# Patient Record
Sex: MALE | Race: NATIVE HAWAIIAN OR OTHER PACIFIC ISLANDER | Employment: FULL TIME | URBAN - METROPOLITAN AREA
[De-identification: names, ages, dates, MRNs, and addresses within clinical notes are randomized per-mention and may not be internally consistent; named-entity substitution may affect disease eponyms.]

---

## 2017-07-28 ENCOUNTER — OFFICE VISIT (OUTPATIENT)
Dept: ENDOCRINOLOGY | Age: 47
End: 2017-07-28

## 2017-07-28 VITALS
RESPIRATION RATE: 20 BRPM | DIASTOLIC BLOOD PRESSURE: 86 MMHG | TEMPERATURE: 97.8 F | BODY MASS INDEX: 37.28 KG/M2 | HEART RATE: 71 BPM | OXYGEN SATURATION: 96 % | WEIGHT: 246 LBS | SYSTOLIC BLOOD PRESSURE: 139 MMHG | HEIGHT: 68 IN

## 2017-07-28 DIAGNOSIS — E78.2 MIXED HYPERLIPIDEMIA: ICD-10-CM

## 2017-07-28 DIAGNOSIS — I10 ESSENTIAL HYPERTENSION: ICD-10-CM

## 2017-07-28 RX ORDER — VALSARTAN 320 MG/1
TABLET ORAL DAILY
COMMUNITY
End: 2017-08-09 | Stop reason: DRUGHIGH

## 2017-07-28 RX ORDER — GLIPIZIDE 5 MG/1
1 TABLET ORAL DAILY
Refills: 0 | COMMUNITY
Start: 2017-06-14 | End: 2017-07-28 | Stop reason: DRUGHIGH

## 2017-07-28 RX ORDER — IBUPROFEN 200 MG
TABLET ORAL
COMMUNITY
End: 2018-01-16 | Stop reason: ALTCHOICE

## 2017-07-28 RX ORDER — METFORMIN HYDROCHLORIDE 1000 MG/1
1000 TABLET ORAL 2 TIMES DAILY WITH MEALS
COMMUNITY
End: 2017-07-28 | Stop reason: ALTCHOICE

## 2017-07-28 RX ORDER — GLIPIZIDE 10 MG/1
TABLET ORAL
Qty: 60 TAB | Refills: 6 | Status: SHIPPED | OUTPATIENT
Start: 2017-07-28 | End: 2017-08-09 | Stop reason: SDUPTHER

## 2017-07-28 NOTE — PATIENT INSTRUCTIONS
Do not skip meals  Do not eat in between meals    Reduce carbs- pasta, rice, potatoes, bread   Do not drink juices or sodas  Donot eat peanut butter     Do not eat sugar free cookies and cakes   Do not eat peaches,  Grapes,  Oranges , raisins, and pineapples     ----------------------------------------------------------------------------------------------------------------------------------------------------------------------    Increase Glipizide 10 mg twice a day , twenty minutes with b-fast and dinner     STOP METFORMIN     Start on  Trulicity 7.19 mg once a week        ---------------------------------------------------------------------------------------------------------------      Elizabeth Foley

## 2017-07-28 NOTE — MR AVS SNAPSHOT
Visit Information Date & Time Provider Department Dept. Phone Encounter #  
 7/28/2017 10:00 AM Marcella Croft MD [facility] [phone] [id number] Follow-up Instructions Return in about 6 weeks (around 9/8/2017). Upcoming Health Maintenance Date Due DTaP/Tdap/Td series (1 - Tdap) 2/2/1990 INFLUENZA AGE 9 TO ADULT 8/1/2017 Allergies as of 7/28/2017  Review Complete On: 7/28/2017 By: Marcella Croft MD  
 Not on File Current Immunizations  Never Reviewed No immunizations on file. Not reviewed this visit You Were Diagnosed With   
  
 Codes Comments Uncontrolled type 2 diabetes mellitus with stage 3 chronic kidney disease, without long-term current use of insulin (HCC)    -  Primary ICD-10-CM: E11.22, E11.65, N18.3 ICD-9-CM: 250.52, 585.3 Vitals BP Pulse Temp Resp Height(growth percentile) Weight(growth percentile) 139/86 71 97.8 °F (36.6 °C) (Oral) 20 5' 8\" (1.727 m) 246 lb (111.6 kg) SpO2 BMI Smoking Status 96% 37.4 kg/m2 Former Smoker BMI and BSA Data Body Mass Index Body Surface Area  
 37.4 kg/m 2 2.31 m 2 Your Updated Medication List  
  
   
This list is accurate as of: 7/28/17 11:06 AM.  Always use your most recent med list. ALLOPURINOL PO Take  by mouth. ATORVASTATIN PO Take  by mouth. DIOVAN 320 mg tablet Generic drug:  valsartan Take  by mouth daily. FISH -160-1,000 mg Cap Generic drug:  omega 3-dha-epa-fish oil Take  by mouth. glipiZIDE 5 mg tablet Commonly known as:  Aldean Temo Take 1 Tab by mouth daily. MOTRIN  mg tablet Generic drug:  ibuprofen Take  by mouth. We Performed the Following HEMOGLOBIN A1C WITH EAG [50238 CPT(R)] LIPID PANEL [19233 CPT(R)] METABOLIC PANEL, COMPREHENSIVE [49377 CPT(R)] MICROALBUMIN, UR, RAND W/ MICROALBUMIN/CREA RATIO [id number] CPT(R)] Follow-up Instructions Return in about 6 weeks (around 9/8/2017). Patient Instructions Do not skip meals Do not eat in between meals Reduce carbs- pasta, rice, potatoes, bread Do not drink juices or sodas Donot eat peanut butter Do not eat sugar free cookies and cakes Do not eat peaches,  Grapes,  Oranges , raisins, and pineapples  
 
---------------------------------------------------------------------------------------------------------------------------------------------------------------------- Increase Glipizide 10 mg twice a day , twenty minutes with b-fast and dinner STOP METFORMIN Start on  Trulicity 4.12 mg once a week   
 
 
--------------------------------------------------------------------------------------------------------------- Elizabeth Foley Introducing [facility]! Alanis Kennedy introduces [employer] patient portal. Now you can access parts of your medical record, email your doctor's office, and request medication refills online. 1. In your internet browser, go to [email or web address] 2. Click on the First Time User? Click Here link in the Sign In box. You will see the New Member Sign Up page. 3. Enter your [employer] Access Code exactly as it appears below. You will not need to use this code after youve completed the sign-up process. If you do not sign up before the expiration date, you must request a new code. · [employer] Access Code: [email or web address] Expires: 10/26/2017 11:06 AM 
 
4. Enter the last four digits of your Social Security Number (xxxx) and Date of Birth (mm/dd/yyyy) as indicated and click Submit. You will be taken to the next sign-up page. 5. Create a [employer] ID. This will be your [employer] login ID and cannot be changed, so think of one that is secure and easy to remember. 6. Create a [employer] password. You can change your password at any time. 7. Enter your Password Reset Question and Answer. This can be used at a later time if you forget your password. 8. Enter your e-mail address. You will receive e-mail notification when new information is available in [address] 9. Click Sign Up. You can now view and download portions of your medical record. 10. Click the Download Summary menu link to download a portable copy of your medical information. If you have questions, please visit the Frequently Asked Questions section of the [employer] website. Remember, [employer] is NOT to be used for urgent needs. For medical emergencies, dial 911. Now available from your iPhone and Android! Please provide this summary of care documentation to your next provider. Your primary care clinician is listed as [address]. If you have any questions after today's visit, please call [phone].

## 2017-07-28 NOTE — LETTER
7/30/2017 7:30 PM 
 
Patient:  Felix Gallegos YOB: 1970 Date of Visit: 7/28/2017 Dear Leila Blue DO 
[address] [place] [id number] VIA Facsimile: [phone] 
 : 
 
 
Thank you for referring Mr. Carlos A Osman to me for evaluation/treatment. Below are the relevant portions of my assessment and plan of care. Wt Readings from Last 3 Encounters:  
07/28/17 246 lb (111.6 kg) Temp Readings from Last 3 Encounters:  
07/28/17 97.8 °F (36.6 °C) (Oral) BP Readings from Last 3 Encounters:  
07/28/17 139/86 Pulse Readings from Last 3 Encounters:  
07/28/17 71 HISTORY OF PRESENT ILLNESS Felix Gallegos is a 49 y.o. male. HPI Patient here for initial visit of Type 2 diabetes mellitus . Referred : by self/pcp H/o diabetes for  8  years Accompanied by wife Current A1C is 8.7 %  and symptoms/problems include polyuria, polydipsia and visual disturbances Current diabetic medications include glipizide   5 mg bid , metformin 1000 mg bid , and 500 mg at lunch time . Current monitoring regimen: home blood tests - daily Home blood sugar records: trend: fluctuating a lot Any episodes of hypoglycemia? no 
 
Weight trend: stable Prior visit with dietician: no 
Current diet: \"unhealthy\" diet in general 
Current exercise: no regular exercise Known diabetic complications: retinopathy, nephropathy and peripheral neuropathy Cardiovascular risk factors: dyslipidemia, diabetes mellitus, obesity, sedentary life style, male gender, hypertension Eye exam current (within one year): no 
LAURENCE: no  
 
Past Medical History:  
Diagnosis Date  DM (diabetes mellitus) ([facility] [address]  HTN (hypertension) Past Surgical History:  
Procedure Laterality Date  HX APPENDECTOMY Current Outpatient Prescriptions Medication Sig  
 glipiZIDE (GLUCOTROL) 5 mg tablet Take 1 Tab by mouth daily.  valsartan (DIOVAN) 320 mg tablet Take  by mouth daily.  metFORMIN (GLUCOPHAGE) 1,000 mg tablet Take 1,000 mg by mouth two (2) times daily (with meals).  ATORVASTATIN CALCIUM (ATORVASTATIN PO) Take  by mouth.  ALLOPURINOL PO Take  by mouth.  omega 3-dha-epa-fish oil (FISH OIL) 100-160-1,000 mg cap Take  by mouth.  ibuprofen (MOTRIN IB) 200 mg tablet Take  by mouth. No current facility-administered medications for this visit. Review of Systems Constitutional: Negative. HENT: Negative. Eyes: Negative for pain and redness. Respiratory: Negative. Cardiovascular: Negative for chest pain, palpitations and leg swelling. Gastrointestinal: Negative. Negative for constipation. Genitourinary: Negative. Musculoskeletal: Negative for myalgias. Skin: Negative. Neurological: Negative. Endo/Heme/Allergies: Negative. Psychiatric/Behavioral: Negative for depression and memory loss. The patient does not have insomnia. Physical Exam  
Constitutional: He is oriented to person, place, and time. He appears well-developed and well-nourished. HENT:  
Head: Normocephalic. Eyes: Conjunctivae and EOM are normal. Pupils are equal, round, and reactive to light. Neck: Normal range of motion. Neck supple. No JVD present. No tracheal deviation present. No thyromegaly present. Cardiovascular: Normal rate, regular rhythm and normal heart sounds. Pulmonary/Chest: Effort normal and breath sounds normal.  
Abdominal: Soft. Bowel sounds are normal.  
Musculoskeletal: Normal range of motion. Lymphadenopathy:  
  He has no cervical adenopathy. Neurological: He is alert and oriented to person, place, and time. He has normal reflexes. Skin: Skin is warm. Psychiatric: He has a normal mood and affect. ASSESSMENT and PLAN 1. Type 2 DM, poorly controlled : a1c is over 10 % Discussed DM 2 patho-physiology extensively STOP metformin for renal insuff Increased Glipizide 10 mg bid Start on trulicity Patient is advised about checking blood sugars 2 times a day and maintaining log book. The danger of having low blood sugars has been explained with inappropriate use of insulin  Patient voiced understanding and using the printed instructions at home. Hypoglycemia management has been explained to the patient. Carbohydrate counting discussed with the patient , referred to free class DTC 
 
lab results and schedule of future lab studies reviewed with patient 
specific diabetic recommendations: diabetic diet discussed in detail, written exchange diet given, low cholesterol diet, weight control and daily exercise discussed, home glucose monitoring emphasized, glucose meter dispensed to patient, all medications, side effects and compliance discussed carefully, use and side effects of insulin is taught and foot care discussed and Podiatry visits discussed 2. Hypoglycemia :  Educated on treating the hypoglycemia. Discussed Glipizide use and prescribed 3. HTN : continue current care. Patient is educated about importance of compliance with anti-hypertensives especially ARB/ACEI 4. Dyslipidemia : continue current meds. Patient is educated about benefits and adverse effects of statins and explained how benefits outweigh risk. 5. use of aspirin to prevent MI and TIA's discussed 6. Diabetic complications :  
 
A. Retinopathy-YES   educated on this complication,  regular f/u with ophthalmologist encouraged B. Nephropathy - YES Creat is 2.0 . Proteinuria present   
educated on this disease and indicated stages and its prognosis. Regular care with nephrologist encouraged C. Peripheral Neuropathy - YES  , mild 
educated on this disease and indicated improvement with good and stable glycemic control D. PAD : NO     order LAURENCE /PVR 
 
E : CAD : NO 
 
F. Erectile dysfunction  :YES 
 
> 50 % of time is spent on counseling If you have questions, please do not hesitate to call me.   I look forward to following Mr. Jimy Mendieta along with you. Sincerely, Lee Cameron MD

## 2017-07-28 NOTE — PROGRESS NOTES
HISTORY OF PRESENT ILLNESS  Lisa Asif is a 49 y.o. male. HPI  Patient here for initial visit of Type 2 diabetes mellitus . Referred : by self/pcp    H/o diabetes for  8  years   Accompanied by wife     Current A1C is 8.7 %  and symptoms/problems include polyuria, polydipsia and visual disturbances     Current diabetic medications include glipizide   5 mg bid , metformin 1000 mg bid , and 500 mg at lunch time . Current monitoring regimen: home blood tests - daily  Home blood sugar records: trend: fluctuating a lot  Any episodes of hypoglycemia? no    Weight trend: stable  Prior visit with dietician: no  Current diet: \"unhealthy\" diet in general  Current exercise: no regular exercise    Known diabetic complications: retinopathy, nephropathy and peripheral neuropathy  Cardiovascular risk factors: dyslipidemia, diabetes mellitus, obesity, sedentary life style, male gender, hypertension    Eye exam current (within one year): no  LAURENCE: no     Past Medical History:   Diagnosis Date    DM (diabetes mellitus) ([facility] [address]     HTN (hypertension)      Past Surgical History:   Procedure Laterality Date    HX APPENDECTOMY       Current Outpatient Prescriptions   Medication Sig    glipiZIDE (GLUCOTROL) 5 mg tablet Take 1 Tab by mouth daily.  valsartan (DIOVAN) 320 mg tablet Take  by mouth daily.  metFORMIN (GLUCOPHAGE) 1,000 mg tablet Take 1,000 mg by mouth two (2) times daily (with meals).  ATORVASTATIN CALCIUM (ATORVASTATIN PO) Take  by mouth.  ALLOPURINOL PO Take  by mouth.  omega 3-dha-epa-fish oil (FISH OIL) 100-160-1,000 mg cap Take  by mouth.  ibuprofen (MOTRIN IB) 200 mg tablet Take  by mouth. No current facility-administered medications for this visit. Review of Systems   Constitutional: Negative. HENT: Negative. Eyes: Negative for pain and redness. Respiratory: Negative. Cardiovascular: Negative for chest pain, palpitations and leg swelling. Gastrointestinal: Negative. Negative for constipation. Genitourinary: Negative. Musculoskeletal: Negative for myalgias. Skin: Negative. Neurological: Negative. Endo/Heme/Allergies: Negative. Psychiatric/Behavioral: Negative for depression and memory loss. The patient does not have insomnia. Physical Exam   Constitutional: He is oriented to person, place, and time. He appears well-developed and well-nourished. HENT:   Head: Normocephalic. Eyes: Conjunctivae and EOM are normal. Pupils are equal, round, and reactive to light. Neck: Normal range of motion. Neck supple. No JVD present. No tracheal deviation present. No thyromegaly present. Cardiovascular: Normal rate, regular rhythm and normal heart sounds. Pulmonary/Chest: Effort normal and breath sounds normal.   Abdominal: Soft. Bowel sounds are normal.   Musculoskeletal: Normal range of motion. Lymphadenopathy:     He has no cervical adenopathy. Neurological: He is alert and oriented to person, place, and time. He has normal reflexes. Skin: Skin is warm. Psychiatric: He has a normal mood and affect. ASSESSMENT and PLAN    1. Type 2 DM, poorly controlled : a1c is over 10 %    Discussed DM 2 patho-physiology extensively     STOP metformin for renal insuff    Increased Glipizide 10 mg bid     Start on trulicity     Patient is advised about checking blood sugars 2 times a day and maintaining log book. The danger of having low blood sugars has been explained with inappropriate use of insulin  Patient voiced understanding and using the printed instructions at home. Hypoglycemia management has been explained to the patient.      Carbohydrate counting discussed with the patient , referred to free class DTC    lab results and schedule of future lab studies reviewed with patient  specific diabetic recommendations: diabetic diet discussed in detail, written exchange diet given, low cholesterol diet, weight control and daily exercise discussed, home glucose monitoring emphasized, glucose meter dispensed to patient, all medications, side effects and compliance discussed carefully, use and side effects of insulin is taught and foot care discussed and Podiatry visits discussed    2. Hypoglycemia :  Educated on treating the hypoglycemia. Discussed Glipizide use and prescribed    3. HTN : continue current care. Patient is educated about importance of compliance with anti-hypertensives especially ARB/ACEI    4. Dyslipidemia : continue current meds. Patient is educated about benefits and adverse effects of statins and explained how benefits outweigh risk. 5. use of aspirin to prevent MI and TIA's discussed      6. Diabetic complications :     A. Retinopathy-YES   educated on this complication,  regular f/u with ophthalmologist encouraged    B. Nephropathy - YES    Creat is 2.0 . Proteinuria present    educated on this disease and indicated stages and its prognosis. Regular care with nephrologist encouraged    C. Peripheral Neuropathy - YES  , mild  educated on this disease and indicated improvement with good and stable glycemic control    D. PAD : NO     order LAURENCE /PVR    E : CAD : NO    F.  Erectile dysfunction  :YES    > 50 % of time is spent on counseling

## 2017-07-28 NOTE — PROGRESS NOTES
Wt Readings from Last 3 Encounters:   07/28/17 246 lb (111.6 kg)     Temp Readings from Last 3 Encounters:   07/28/17 97.8 °F (36.6 °C) (Oral)     BP Readings from Last 3 Encounters:   07/28/17 139/86     Pulse Readings from Last 3 Encounters:   07/28/17 71

## 2017-07-28 NOTE — LETTER
NOTIFICATION RETURN TO WORK / SCHOOL 
 
7/28/2017 11:28 AM 
 
Mr. Karina Peña [address] [id number] To Whom It May Concern: 
 
Karina Peña is currently under the care of [id number] [place] [address] Road. He will return to work/school on:7/29/2017. If there are questions or concerns please have the patient contact our office. Sincerely, Mel Turcios MD

## 2017-07-29 LAB
ALBUMIN SERPL-MCNC: 4.3 G/DL (ref 3.5–5.5)
ALBUMIN/CREAT UR: 2384.6 MG/G CREAT (ref 0–30)
ALBUMIN/GLOB SERPL: 1.5 {RATIO} (ref 1.2–2.2)
ALP SERPL-CCNC: 86 IU/L (ref 39–117)
ALT SERPL-CCNC: 50 IU/L (ref 0–44)
AST SERPL-CCNC: 31 IU/L (ref 0–40)
BILIRUB SERPL-MCNC: 0.3 MG/DL (ref 0–1.2)
BUN SERPL-MCNC: 30 MG/DL (ref 6–24)
BUN/CREAT SERPL: 18 (ref 9–20)
CALCIUM SERPL-MCNC: 9.9 MG/DL (ref 8.7–10.2)
CHLORIDE SERPL-SCNC: 95 MMOL/L (ref 96–106)
CHOLEST SERPL-MCNC: 132 MG/DL (ref 100–199)
CO2 SERPL-SCNC: 27 MMOL/L (ref 18–29)
CREAT SERPL-MCNC: 1.65 MG/DL (ref 0.76–1.27)
CREAT UR-MCNC: 82.7 MG/DL
EST. AVERAGE GLUCOSE BLD GHB EST-MCNC: 174 MG/DL
GLOBULIN SER CALC-MCNC: 2.8 G/DL (ref 1.5–4.5)
GLUCOSE SERPL-MCNC: 89 MG/DL (ref 65–99)
HBA1C MFR BLD: 7.7 % (ref 4.8–5.6)
HDLC SERPL-MCNC: 31 MG/DL
INTERPRETATION, 910389: NORMAL
INTERPRETATION: NORMAL
LDLC SERPL CALC-MCNC: 58 MG/DL (ref 0–99)
Lab: NORMAL
MICROALBUMIN UR-MCNC: 1972.1 UG/ML
PDF IMAGE, 910387: NORMAL
POTASSIUM SERPL-SCNC: 4.1 MMOL/L (ref 3.5–5.2)
PROT SERPL-MCNC: 7.1 G/DL (ref 6–8.5)
SODIUM SERPL-SCNC: 140 MMOL/L (ref 134–144)
TRIGL SERPL-MCNC: 215 MG/DL (ref 0–149)
VLDLC SERPL CALC-MCNC: 43 MG/DL (ref 5–40)

## 2017-07-31 NOTE — PROGRESS NOTES
His kidney values in blood is better, and only low dose metformin er 500 mg bid is okay .  So start it please  But urine is leaking a lot of protein, indicative of diabetic kidney damage

## 2017-08-09 RX ORDER — VALSARTAN AND HYDROCHLOROTHIAZIDE 320; 25 MG/1; MG/1
1 TABLET, FILM COATED ORAL DAILY
Qty: 90 TAB | Refills: 4 | Status: SHIPPED | OUTPATIENT
Start: 2017-08-09 | End: 2018-09-24 | Stop reason: SDUPTHER

## 2017-08-09 RX ORDER — METFORMIN HYDROCHLORIDE 500 MG/1
500 TABLET, EXTENDED RELEASE ORAL 2 TIMES DAILY WITH MEALS
Qty: 180 TAB | Refills: 4 | Status: SHIPPED | OUTPATIENT
Start: 2017-08-09 | End: 2017-10-16 | Stop reason: SDUPTHER

## 2017-08-09 RX ORDER — ATORVASTATIN CALCIUM 10 MG/1
10 TABLET, FILM COATED ORAL
Qty: 90 TAB | Refills: 4 | Status: SHIPPED | OUTPATIENT
Start: 2017-08-09 | End: 2017-10-16 | Stop reason: SDUPTHER

## 2017-08-09 RX ORDER — GLIPIZIDE 10 MG/1
TABLET ORAL
Qty: 180 TAB | Refills: 4 | Status: SHIPPED | OUTPATIENT
Start: 2017-08-09 | End: 2017-10-16 | Stop reason: SDUPTHER

## 2017-09-08 ENCOUNTER — TELEPHONE (OUTPATIENT)
Dept: ENDOCRINOLOGY | Age: 47
End: 2017-09-08

## 2017-09-08 ENCOUNTER — OFFICE VISIT (OUTPATIENT)
Dept: ENDOCRINOLOGY | Age: 47
End: 2017-09-08

## 2017-09-08 VITALS
HEART RATE: 81 BPM | BODY MASS INDEX: 35.31 KG/M2 | TEMPERATURE: 97.5 F | RESPIRATION RATE: 14 BRPM | WEIGHT: 233 LBS | DIASTOLIC BLOOD PRESSURE: 74 MMHG | HEIGHT: 68 IN | SYSTOLIC BLOOD PRESSURE: 117 MMHG

## 2017-09-08 DIAGNOSIS — I10 ESSENTIAL HYPERTENSION: ICD-10-CM

## 2017-09-08 DIAGNOSIS — N18.30 TYPE 2 DIABETES MELLITUS WITH STAGE 3 CHRONIC KIDNEY DISEASE, WITHOUT LONG-TERM CURRENT USE OF INSULIN (HCC): Primary | ICD-10-CM

## 2017-09-08 DIAGNOSIS — E78.2 MIXED HYPERLIPIDEMIA: ICD-10-CM

## 2017-09-08 DIAGNOSIS — E11.22 TYPE 2 DIABETES MELLITUS WITH STAGE 3 CHRONIC KIDNEY DISEASE, WITHOUT LONG-TERM CURRENT USE OF INSULIN (HCC): Primary | ICD-10-CM

## 2017-09-08 RX ORDER — CLONIDINE HYDROCHLORIDE 0.1 MG/1
TABLET ORAL 2 TIMES DAILY
COMMUNITY
End: 2017-09-08 | Stop reason: ALTCHOICE

## 2017-09-08 NOTE — PROGRESS NOTES
HISTORY OF PRESENT ILLNESS  Anjali Forbes is a 49 y.o. male. HPI  Patient here for   First f/u after initial visit of Type 2 diabetes mellitus   From July 2017  . Lost 13 lbs   He has done all the instructions correctly   He is happy with the control     Has  A  few low sugars         Old history :    Referred : by self/pcp    H/o diabetes for  8  years   Accompanied by wife     Current A1C is 8.7 %  and symptoms/problems include polyuria, polydipsia and visual disturbances     Current diabetic medications include glipizide   5 mg bid , metformin 1000 mg bid , and 500 mg at lunch time . Current monitoring regimen: home blood tests - daily  Home blood sugar records: trend: fluctuating a lot  Any episodes of hypoglycemia? no    Weight trend: stable  Prior visit with dietician: no  Current diet: \"unhealthy\" diet in general  Current exercise: no regular exercise    Known diabetic complications: retinopathy, nephropathy and peripheral neuropathy  Cardiovascular risk factors: dyslipidemia, diabetes mellitus, obesity, sedentary life style, male gender, hypertension    Eye exam current (within one year): no  LAURENCE: no     Past Medical History:   Diagnosis Date    DM (diabetes mellitus) ([facility] [address]     HTN (hypertension)      Past Surgical History:   Procedure Laterality Date    HX APPENDECTOMY       Current Outpatient Prescriptions   Medication Sig    cloNIDine HCl (CATAPRES) 0.1 mg tablet Take  by mouth two (2) times a day.  glipiZIDE (GLUCOTROL) 10 mg tablet Take 1 tab twenty minutes before breakfast, and 1 tab twenty minutes before dinner. Stop 5 mg dose    dulaglutide (TRULICITY) 5.99 OK/0.6 mL sub-q pen 0.5 mL by SubCUTAneous route every seven (7) days.  atorvastatin (LIPITOR) 10 mg tablet Take 1 Tab by mouth nightly.  valsartan-hydroCHLOROthiazide (DIOVAN-HCT) 320-25 mg per tablet Take 1 Tab by mouth daily.     metFORMIN ER (GLUCOPHAGE XR) 500 mg tablet Take 1 Tab by mouth two (2) times daily (with meals).  ALLOPURINOL PO Take 100 mg by mouth daily.  omega 3-dha-epa-fish oil (FISH OIL) 100-160-1,000 mg cap Take  by mouth.  ibuprofen (MOTRIN IB) 200 mg tablet Take  by mouth. No current facility-administered medications for this visit. Review of Systems   Constitutional: Negative. HENT: Negative. Eyes: Negative for pain and redness. Respiratory: Negative. Cardiovascular: Negative for chest pain, palpitations and leg swelling. Gastrointestinal: Negative. Negative for constipation. Genitourinary: Negative. Musculoskeletal: Negative for myalgias. Skin: Negative. Neurological: Negative. Endo/Heme/Allergies: Negative. Psychiatric/Behavioral: Negative for depression and memory loss. The patient does not have insomnia. Physical Exam   Constitutional: He is oriented to person, place, and time. He appears well-developed and well-nourished. HENT:   Head: Normocephalic. Eyes: Conjunctivae and EOM are normal. Pupils are equal, round, and reactive to light. Neck: Normal range of motion. Neck supple. No JVD present. No tracheal deviation present. No thyromegaly present. Cardiovascular: Normal rate, regular rhythm and normal heart sounds. Pulmonary/Chest: Effort normal and breath sounds normal.   Abdominal: Soft. Bowel sounds are normal.   Musculoskeletal: Normal range of motion. Lymphadenopathy:     He has no cervical adenopathy. Neurological: He is alert and oriented to person, place, and time. He has normal reflexes. Skin: Skin is warm. Psychiatric: He has a normal mood and affect.          Lab Results  Component Value Date/Time   Hemoglobin A1c 7.7 07/28/2017 11:15 AM   Glucose 89 07/28/2017 11:15 AM   Microalb/Creat ratio (ug/mg creat.) 2384.6 07/28/2017 11:15 AM   LDL, calculated 58 07/28/2017 11:15 AM   Creatinine 1.65 07/28/2017 11:15 AM      Lab Results  Component Value Date/Time   Cholesterol, total 132 07/28/2017 11:15 AM   HDL Cholesterol 31 07/28/2017 11:15 AM   LDL, calculated 58 07/28/2017 11:15 AM   Triglyceride 215 07/28/2017 11:15 AM   Lab Results  Component Value Date/Time   ALT (SGPT) 50 07/28/2017 11:15 AM   AST (SGOT) 31 07/28/2017 11:15 AM   Alk. phosphatase 86 07/28/2017 11:15 AM   Bilirubin, total 0.3 07/28/2017 11:15 AM   Albumin 4.3 07/28/2017 11:15 AM   Protein, total 7.1 07/28/2017 11:15 AM       Lab Results  Component Value Date/Time   GFR est non-AA 48 07/28/2017 11:15 AM   GFR est AA 56 07/28/2017 11:15 AM   Creatinine 1.65 07/28/2017 11:15 AM   BUN 30 07/28/2017 11:15 AM   Sodium 140 07/28/2017 11:15 AM   Potassium 4.1 07/28/2017 11:15 AM   Chloride 95 07/28/2017 11:15 AM   CO2 27 07/28/2017 11:15 AM                           ASSESSMENT and PLAN    1. Type 2 DM, poorly controlled :  Await A1c today    a1c is over 10 %    He has done excellently   Restarted metformin  500 mg twice a day for renal insuff  Decreased Glipizide to 5  mg bid   Stay  on [facility]     Patient is advised about checking blood sugars 2 times a day and maintaining log book. The danger of having low blood sugars has been explained with inappropriate use of insulin  Patient voiced understanding and using the printed instructions at home. Hypoglycemia management has been explained to the patient. 2. Hypoglycemia :  Educated on treating the hypoglycemia. Decreased Glipizide use and prescribed    3. HTN : well controlled, continue diovan-hctz. Patient is educated about importance of compliance with anti-hypertensives especially ARB/ACEI    4. Dyslipidemia : continue lipitor . Patient is educated about benefits and adverse effects of statins and explained how benefits outweigh risk. 5. use of aspirin to prevent MI and TIA's discussed      6. Diabetic complications :     A. Retinopathy-YES   educated on this complication,  regular f/u with ophthalmologist encouraged    B. Nephropathy - YES    Creat is 1.6 .    Significant Proteinuria present    educated on this disease and indicated stages and its prognosis. Regular care with nephrologist encouraged    C. Peripheral Neuropathy - YES  , mild  educated on this disease and indicated improvement with good and stable glycemic control    D.  PAD : NO        E : CAD : NO      > 50 % of time is spent on counseling

## 2017-09-08 NOTE — PATIENT INSTRUCTIONS
Do not skip meals  Do not eat in between meals    Reduce carbs- pasta, rice, potatoes, bread   Do not drink juices or sodas  Donot eat peanut butter     Do not eat sugar free cookies and cakes   Do not eat peaches,  Grapes,  Oranges , raisins, and pineapples     ----------------------------------------------------------------------------------------------------------------------------------------------------------------------    decrease Glipizide 5 mg twice a day , twenty minutes with b-fast and dinner     STOP Clonidine     METFORMIN  Er 500 mg twice a day with food     Stay  on  Trulicity 6.56 mg once a week        ---------------------------------------------------------------------------------------------------------------      Yared Klein

## 2017-09-08 NOTE — PROGRESS NOTES
Wt Readings from Last 3 Encounters:   09/08/17 233 lb (105.7 kg)   07/28/17 246 lb (111.6 kg)     Temp Readings from Last 3 Encounters:   09/08/17 97.5 °F (36.4 °C) (Oral)   07/28/17 97.8 °F (36.6 °C) (Oral)     BP Readings from Last 3 Encounters:   09/08/17 117/74   07/28/17 139/86     Pulse Readings from Last 3 Encounters:   09/08/17 81   07/28/17 71     Lab Results   Component Value Date/Time    Hemoglobin A1c 7.7 07/28/2017 11:15 AM

## 2017-09-08 NOTE — LETTER
NOTIFICATION RETURN TO WORK / SCHOOL 
 
9/8/2017 12:26 PM 
 
Mr. Augustina Correa [address] [address] To Whom It May Concern: 
 
Augustina Correa is currently under the care of [address] [address] [address] He will return to work/school on:09/09/2017 If there are questions or concerns please have the patient contact our office. Sincerely, Miroslava Lopez MD

## 2017-09-08 NOTE — MR AVS SNAPSHOT
Visit Information Date & Time Provider Department Dept. Phone Encounter #  
 9/8/2017 11:15 AM Chun Borges MD [facility] Diabetes & Endocrinology [phone] [id number] Follow-up Instructions Return in about 3 months (around 12/8/2017). Upcoming Health Maintenance Date Due DTaP/Tdap/Td series (1 - Tdap) 2/2/1990 INFLUENZA AGE 9 TO ADULT 8/1/2017 Allergies as of 9/8/2017  Review Complete On: 9/8/2017 By: Chun Borges MD  
 Not on File Current Immunizations  Never Reviewed No immunizations on file. Not reviewed this visit You Were Diagnosed With   
  
 Codes Comments Type 2 diabetes mellitus with stage 3 chronic kidney disease, without long-term current use of insulin (HCC)    -  Primary ICD-10-CM: E11.22, N18.3 ICD-9-CM: 250.40, 585.3 Essential hypertension     ICD-10-CM: I10 
ICD-9-CM: 401.9 Mixed hyperlipidemia     ICD-10-CM: E78.2 ICD-9-CM: 272.2 Vitals BP Pulse Temp Resp Height(growth percentile) Weight(growth percentile) 117/74 (BP 1 Location: Left arm, BP Patient Position: Sitting) 81 97.5 °F (36.4 °C) (Oral) 14 5' 8\" (1.727 m) 233 lb (105.7 kg) BMI Smoking Status 35.43 kg/m2 Former Smoker BMI and BSA Data Body Mass Index Body Surface Area  
 35.43 kg/m 2 2.25 m 2 Preferred Pharmacy Pharmacy Name Phone [address] [place] [address], [facility] Sandra Gutiérrez\[facility] [place] [facility]\"" [phone] Your Updated Medication List  
  
   
This list is accurate as of: 9/8/17 12:18 PM.  Always use your most recent med list. ALLOPURINOL PO Take 100 mg by mouth daily. atorvastatin 10 mg tablet Commonly known as:  LIPITOR Take 1 Tab by mouth nightly. dulaglutide 0.75 mg/0.5 mL sub-q pen Commonly known as:  TRULICITY  
0.5 mL by SubCUTAneous route every seven (7) days. FISH -160-1,000 mg Cap Generic drug:  omega 3-dha-epa-fish oil Take  by mouth. glipiZIDE 10 mg tablet Commonly known as:  [place] Fuel Take 1 tab twenty minutes before breakfast, and 1 tab twenty minutes before dinner. Stop 5 mg dose  
  
 metFORMIN  mg tablet Commonly known as:  GLUCOPHAGE XR Take 1 Tab by mouth two (2) times daily (with meals). MOTRIN  mg tablet Generic drug:  ibuprofen Take  by mouth.  
  
 valsartan-hydroCHLOROthiazide 320-25 mg per tablet Commonly known as:  DIOVAN-HCT Take 1 Tab by mouth daily. We Performed the Following HEMOGLOBIN A1C WITH EAG [31498 CPT(R)] LIPID PANEL [60113 CPT(R)] METABOLIC PANEL, COMPREHENSIVE [03810 CPT(R)] MICROALBUMIN, UR, RAND W/ MICROALBUMIN/CREA RATIO [id number] CPT(R)] Follow-up Instructions Return in about 3 months (around 12/8/2017). Patient Instructions Do not skip meals Do not eat in between meals Reduce carbs- pasta, rice, potatoes, bread Do not drink juices or sodas Donot eat peanut butter Do not eat sugar free cookies and cakes Do not eat peaches,  Grapes,  Oranges , raisins, and pineapples  
 
---------------------------------------------------------------------------------------------------------------------------------------------------------------------- 
 
decrease Glipizide 5 mg twice a day , twenty minutes with b-fast and dinner STOP Clonidine METFORMIN  Er 500 mg twice a day with food Stay  on  Trulicity 6.56 mg once a week   
 
 
--------------------------------------------------------------------------------------------------------------- Laureen Pimentel Introducing [facility] & HEALTH SERVICES! [facility] [facility] introduces [employer] patient portal. Now you can access parts of your medical record, email your doctor's office, and request medication refills online. 1. In your internet browser, go to [email or web address] 2. Click on the First Time User? Click Here link in the Sign In box.  You will see the New Member Sign Up page. 3. Enter your Long Tail Access Code exactly as it appears below. You will not need to use this code after youve completed the sign-up process. If you do not sign up before the expiration date, you must request a new code. · Long Tail Access Code: [email or web address] Expires: 10/26/2017 11:06 AM 
 
4. Enter the last four digits of your Social Security Number (xxxx) and Date of Birth (mm/dd/yyyy) as indicated and click Submit. You will be taken to the next sign-up page. 5. Create a Mobiveryt ID. This will be your Long Tail login ID and cannot be changed, so think of one that is secure and easy to remember. 6. Create a Long Tail password. You can change your password at any time. 7. Enter your Password Reset Question and Answer. This can be used at a later time if you forget your password. 8. Enter your e-mail address. You will receive e-mail notification when new information is available in [address] 9. Click Sign Up. You can now view and download portions of your medical record. 10. Click the Download Summary menu link to download a portable copy of your medical information. If you have questions, please visit the Frequently Asked Questions section of the Long Tail website. Remember, Long Tail is NOT to be used for urgent needs. For medical emergencies, dial 911. Now available from your iPhone and Android! Please provide this summary of care documentation to your next provider. Your primary care clinician is listed as Luz Loco. If you have any questions after today's visit, please call [phone].

## 2017-09-09 LAB
ALBUMIN SERPL-MCNC: 4.6 G/DL (ref 3.5–5.5)
ALBUMIN/CREAT UR: 1107.1 MG/G CREAT (ref 0–30)
ALBUMIN/GLOB SERPL: 1.6 {RATIO} (ref 1.2–2.2)
ALP SERPL-CCNC: 72 IU/L (ref 39–117)
ALT SERPL-CCNC: 40 IU/L (ref 0–44)
AST SERPL-CCNC: 29 IU/L (ref 0–40)
BILIRUB SERPL-MCNC: <0.2 MG/DL (ref 0–1.2)
BUN SERPL-MCNC: 65 MG/DL (ref 6–24)
BUN/CREAT SERPL: 27 (ref 9–20)
CALCIUM SERPL-MCNC: 10.1 MG/DL (ref 8.7–10.2)
CHLORIDE SERPL-SCNC: 99 MMOL/L (ref 96–106)
CHOLEST SERPL-MCNC: 111 MG/DL (ref 100–199)
CO2 SERPL-SCNC: 24 MMOL/L (ref 18–29)
CREAT SERPL-MCNC: 2.44 MG/DL (ref 0.76–1.27)
CREAT UR-MCNC: 90 MG/DL
EST. AVERAGE GLUCOSE BLD GHB EST-MCNC: 137 MG/DL
GLOBULIN SER CALC-MCNC: 2.8 G/DL (ref 1.5–4.5)
GLUCOSE SERPL-MCNC: 63 MG/DL (ref 65–99)
HBA1C MFR BLD: 6.4 % (ref 4.8–5.6)
HDLC SERPL-MCNC: 30 MG/DL
INTERPRETATION, 910389: NORMAL
INTERPRETATION: NORMAL
LDLC SERPL CALC-MCNC: 48 MG/DL (ref 0–99)
Lab: NORMAL
MICROALBUMIN UR-MCNC: 996.4 UG/ML
PDF IMAGE, 910387: NORMAL
POTASSIUM SERPL-SCNC: 4.3 MMOL/L (ref 3.5–5.2)
PROT SERPL-MCNC: 7.4 G/DL (ref 6–8.5)
SODIUM SERPL-SCNC: 140 MMOL/L (ref 134–144)
TRIGL SERPL-MCNC: 166 MG/DL (ref 0–149)
VLDLC SERPL CALC-MCNC: 33 MG/DL (ref 5–40)

## 2017-10-16 RX ORDER — ATORVASTATIN CALCIUM 10 MG/1
10 TABLET, FILM COATED ORAL
Qty: 90 TAB | Refills: 4 | Status: SHIPPED | OUTPATIENT
Start: 2017-10-16 | End: 2018-12-21 | Stop reason: SDUPTHER

## 2017-10-16 RX ORDER — GLIPIZIDE 10 MG/1
TABLET ORAL
Qty: 180 TAB | Refills: 4 | Status: SHIPPED | OUTPATIENT
Start: 2017-10-16 | End: 2018-01-16 | Stop reason: ALTCHOICE

## 2017-10-16 RX ORDER — METFORMIN HYDROCHLORIDE 500 MG/1
500 TABLET, EXTENDED RELEASE ORAL 2 TIMES DAILY WITH MEALS
Qty: 180 TAB | Refills: 4 | Status: SHIPPED | OUTPATIENT
Start: 2017-10-16 | End: 2018-01-16 | Stop reason: ALTCHOICE

## 2017-10-16 NOTE — TELEPHONE ENCOUNTER
Refill on Trulicity, Metformin, Lipitor, Glipizide, and Allopurnol 90 day supply to [facility].

## 2018-01-09 LAB
ALBUMIN SERPL-MCNC: 4.4 G/DL (ref 3.5–5.5)
ALBUMIN/CREAT UR: 766 MG/G CREAT (ref 0–30)
ALBUMIN/GLOB SERPL: 1.6 {RATIO} (ref 1.2–2.2)
ALP SERPL-CCNC: 78 IU/L (ref 39–117)
ALT SERPL-CCNC: 32 IU/L (ref 0–44)
AST SERPL-CCNC: 20 IU/L (ref 0–40)
BILIRUB SERPL-MCNC: 0.2 MG/DL (ref 0–1.2)
BUN SERPL-MCNC: 49 MG/DL (ref 6–24)
BUN/CREAT SERPL: 22 (ref 9–20)
CALCIUM SERPL-MCNC: 9.9 MG/DL (ref 8.7–10.2)
CHLORIDE SERPL-SCNC: 100 MMOL/L (ref 96–106)
CHOLEST SERPL-MCNC: 112 MG/DL (ref 100–199)
CO2 SERPL-SCNC: 26 MMOL/L (ref 18–29)
CREAT SERPL-MCNC: 2.23 MG/DL (ref 0.76–1.27)
CREAT UR-MCNC: 96.8 MG/DL
EST. AVERAGE GLUCOSE BLD GHB EST-MCNC: 108 MG/DL
GLOBULIN SER CALC-MCNC: 2.8 G/DL (ref 1.5–4.5)
GLUCOSE SERPL-MCNC: 87 MG/DL (ref 65–99)
HBA1C MFR BLD: 5.4 % (ref 4.8–5.6)
HDLC SERPL-MCNC: 35 MG/DL
INTERPRETATION, 910389: NORMAL
INTERPRETATION: NORMAL
LDLC SERPL CALC-MCNC: 57 MG/DL (ref 0–99)
Lab: NORMAL
MICROALBUMIN UR-MCNC: 741.5 UG/ML
PDF IMAGE, 910387: NORMAL
POTASSIUM SERPL-SCNC: 3.9 MMOL/L (ref 3.5–5.2)
PROT SERPL-MCNC: 7.2 G/DL (ref 6–8.5)
SODIUM SERPL-SCNC: 142 MMOL/L (ref 134–144)
TRIGL SERPL-MCNC: 102 MG/DL (ref 0–149)
VLDLC SERPL CALC-MCNC: 20 MG/DL (ref 5–40)

## 2018-01-16 ENCOUNTER — OFFICE VISIT (OUTPATIENT)
Dept: ENDOCRINOLOGY | Age: 48
End: 2018-01-16

## 2018-01-16 VITALS
DIASTOLIC BLOOD PRESSURE: 70 MMHG | WEIGHT: 212.9 LBS | BODY MASS INDEX: 32.27 KG/M2 | RESPIRATION RATE: 18 BRPM | SYSTOLIC BLOOD PRESSURE: 107 MMHG | HEART RATE: 55 BPM | HEIGHT: 68 IN | TEMPERATURE: 97.1 F | OXYGEN SATURATION: 97 %

## 2018-01-16 DIAGNOSIS — I10 ESSENTIAL HYPERTENSION: ICD-10-CM

## 2018-01-16 DIAGNOSIS — N18.30 STAGE 3 CHRONIC KIDNEY DISEASE (HCC): ICD-10-CM

## 2018-01-16 DIAGNOSIS — R80.1 PERSISTENT PROTEINURIA: ICD-10-CM

## 2018-01-16 DIAGNOSIS — E78.2 MIXED HYPERLIPIDEMIA: ICD-10-CM

## 2018-01-16 RX ORDER — METFORMIN HYDROCHLORIDE 500 MG/1
500 TABLET, EXTENDED RELEASE ORAL 2 TIMES DAILY WITH MEALS
Qty: 180 TAB | Refills: 4 | Status: SHIPPED | OUTPATIENT
Start: 2018-01-16 | End: 2019-04-11 | Stop reason: SDUPTHER

## 2018-01-16 NOTE — PROGRESS NOTES
HISTORY OF PRESENT ILLNESS  Corey Larkin is a 49 y.o. male. HPI  Patient here for  f/u after last visit of Type 2 diabetes mellitus   From sept 2017  . Lost 11 lbs   He got hospitalized for ARF from Motrin use / rash    He was told to take lesser doses of metformin and was told to stop glipizide   He was seen by Dr. Ken Armas, nephrologist     He is moving to [facility]          Old history :    Referred : by self/pcp    H/o diabetes for  8  years   Accompanied by wife     Current A1C is 8.7 %  and symptoms/problems include polyuria, polydipsia and visual disturbances     Current diabetic medications include glipizide   5 mg bid , metformin 1000 mg bid , and 500 mg at lunch time . Current monitoring regimen: home blood tests - daily  Home blood sugar records: trend: fluctuating a lot  Any episodes of hypoglycemia? no    Weight trend: stable  Prior visit with dietician: no  Current diet: \"unhealthy\" diet in general  Current exercise: no regular exercise    Known diabetic complications: retinopathy, nephropathy and peripheral neuropathy  Cardiovascular risk factors: dyslipidemia, diabetes mellitus, obesity, sedentary life style, male gender, hypertension    Eye exam current (within one year): no  LAURENCE: no     Past Medical History:   Diagnosis Date    DM (diabetes mellitus) ([facility] [address]     HTN (hypertension)      Past Surgical History:   Procedure Laterality Date    HX APPENDECTOMY       Current Outpatient Prescriptions   Medication Sig    metFORMIN ER (GLUCOPHAGE XR) 500 mg tablet Take 1 Tab by mouth two (2) times daily (with meals).  dulaglutide (TRULICITY) 6.63 TW/4.3 mL sub-q pen 0.5 mL by SubCUTAneous route every seven (7) days.  atorvastatin (LIPITOR) 10 mg tablet Take 1 Tab by mouth nightly.  valsartan-hydroCHLOROthiazide (DIOVAN-HCT) 320-25 mg per tablet Take 1 Tab by mouth daily.  ALLOPURINOL PO Take 100 mg by mouth daily.     omega 3-dha-epa-fish oil (FISH OIL) 100-160-1,000 mg cap Take by mouth.  glipiZIDE (GLUCOTROL) 10 mg tablet Take 1 tab twenty minutes before breakfast, and 1 tab twenty minutes before dinner. Stop 5 mg dose    ibuprofen (MOTRIN IB) 200 mg tablet Take  by mouth. No current facility-administered medications for this visit. Review of Systems   Constitutional: Negative. HENT: Negative. Eyes: Negative for pain and redness. Respiratory: Negative. Cardiovascular: Negative for chest pain, palpitations and leg swelling. Gastrointestinal: Negative. Negative for constipation. Genitourinary: Negative. Musculoskeletal: Negative for myalgias. Skin: Negative. Neurological: Negative. Endo/Heme/Allergies: Negative. Psychiatric/Behavioral: Negative for depression and memory loss. The patient does not have insomnia. Physical Exam   Constitutional: He is oriented to person, place, and time. He appears well-developed and well-nourished. HENT:   Head: Normocephalic. Eyes: Conjunctivae and EOM are normal. Pupils are equal, round, and reactive to light. Neck: Normal range of motion. Neck supple. No JVD present. No tracheal deviation present. No thyromegaly present. Cardiovascular: Normal rate, regular rhythm and normal heart sounds. Pulmonary/Chest: Effort normal and breath sounds normal.   Abdominal: Soft. Bowel sounds are normal.   Musculoskeletal: Normal range of motion. Lymphadenopathy:     He has no cervical adenopathy. Neurological: He is alert and oriented to person, place, and time. He has normal reflexes. Skin: Skin is warm. Psychiatric: He has a normal mood and affect.      Diabetic foot exam: jan 2018    Left: Reflexes nd     Vibratory sensation normal    Proprioception nd   Sharp/dull discrimination nd    Filament test normal sensation with micro filament   Pulse DP: 2+ (normal)   Pulse PT: nd   Deformities: None  Right: Reflexes nd   Vibratory sensation normal   Proprioception nd   Sharp/dull discrimination nd   Filament test normal sensation with micro filament   Pulse DP: 2+ (normal)   Pulse PT: nd   Deformities: None          Lab Results   Component Value Date/Time    Hemoglobin A1c 5.4 01/08/2018 09:18 AM    Hemoglobin A1c 6.4 09/08/2017 12:23 PM    Hemoglobin A1c 7.7 07/28/2017 11:15 AM    Glucose 87 01/08/2018 09:18 AM    Microalb/Creat ratio (ug/mg creat.) 766.0 01/08/2018 09:18 AM    LDL, calculated 57 01/08/2018 09:18 AM    Creatinine 2.23 01/08/2018 09:18 AM      Lab Results   Component Value Date/Time    Cholesterol, total 112 01/08/2018 09:18 AM    HDL Cholesterol 35 01/08/2018 09:18 AM    LDL, calculated 57 01/08/2018 09:18 AM    Triglyceride 102 01/08/2018 09:18 AM     Lab Results   Component Value Date/Time    ALT (SGPT) 32 01/08/2018 09:18 AM    AST (SGOT) 20 01/08/2018 09:18 AM    Alk. phosphatase 78 01/08/2018 09:18 AM    Bilirubin, total 0.2 01/08/2018 09:18 AM    Albumin 4.4 01/08/2018 09:18 AM    Protein, total 7.2 01/08/2018 09:18 AM       Lab Results   Component Value Date/Time    GFR est non-AA 34 01/08/2018 09:18 AM    GFR est AA 39 01/08/2018 09:18 AM    Creatinine 2.23 01/08/2018 09:18 AM    BUN 49 01/08/2018 09:18 AM    Sodium 142 01/08/2018 09:18 AM    Potassium 3.9 01/08/2018 09:18 AM    Chloride 100 01/08/2018 09:18 AM    CO2 26 01/08/2018 09:18 AM         ASSESSMENT and PLAN    1. Type 2 DM, uncontrolled :  a1c   Is   5.4 % compared to  a1c 6.4 %     From sept 2017    Compared to   7.7 %   From July 2017   Compared to  over 10 %    He has done well   Held off  metformin  But started him back on it at 500 mg twice a day for ARF at hospital   Stopped Glipizide to 5  mg bid for ARF at hospital     Stay  on trulicity ONLY    Patient is advised about checking blood sugars 2 times a day and maintaining log book. The danger of having low blood sugars has been explained with inappropriate use of insulin  Patient voiced understanding and using the printed instructions at home.  Hypoglycemia management has been explained to the patient. 2. Hypoglycemia :  Educated on treating the hypoglycemia. Decreased Glipizide use and prescribed    3. HTN : well controlled, continue diovan-hctz. Patient is educated about importance of compliance with anti-hypertensives especially ARB/ACEI    4. Dyslipidemia : continue lipitor . Patient is educated about benefits and adverse effects of statins and explained how benefits outweigh risk. 5. use of aspirin to prevent MI and TIA's discussed      6. Diabetic complications :     A. Retinopathy-YES   educated on this complication,  regular f/u with ophthalmologist encouraged    B. Nephropathy - YES  At 2.4  Compared to Creat is 1.6  From July 2017 . Significant Proteinuria present    educated on this disease and indicated stages and its prognosis. Regular care with nephrologist encouraged    C.  Peripheral Neuropathy - YES  , mild  educated on this disease and indicated improvement with good and stable glycemic control          > 50 % of time is spent on counseling   Patient voiced understanding her plan of care

## 2018-01-16 NOTE — PROGRESS NOTES
1. Have you been to the ER, urgent care clinic since your last visit? Hospitalized since your last visit? No    2. Have you seen or consulted any other health care providers outside of the [address] [place] [address] since your last visit? Include any pap smears or colon scree.  No     Chief Complaint   Patient presents with    Diabetes     follow up        Wt Readings from Last 3 Encounters:   01/16/18 212 lb 14.4 oz (96.6 kg)   09/08/17 233 lb (105.7 kg)   07/28/17 246 lb (111.6 kg)     Temp Readings from Last 3 Encounters:   01/16/18 97.1 °F (36.2 °C) (Oral)   09/08/17 97.5 °F (36.4 °C) (Oral)   07/28/17 97.8 °F (36.6 °C) (Oral)     BP Readings from Last 3 Encounters:   01/16/18 107/70   09/08/17 117/74   07/28/17 139/86     Pulse Readings from Last 3 Encounters:   01/16/18 (!) 55   09/08/17 81   07/28/17 71     Lab Results   Component Value Date/Time    Hemoglobin A1c 5.4 01/08/2018 09:18 AM

## 2018-01-16 NOTE — PATIENT INSTRUCTIONS
Do not skip meals  Do not eat in between meals    Reduce carbs- pasta, rice, potatoes, bread   Do not drink juices or sodas  Donot eat peanut butter     Do not eat sugar free cookies and cakes   Do not eat peaches,  Grapes,  Oranges , raisins, and pineapples     ----------------------------------------------------------------------------------------------------------------------------------------------------------------------    Stop   Glipizide     METFORMIN  Er 500 mg twice a day with food     Stay  on  Trulicity 2.15 mg once a week

## 2018-01-16 NOTE — MR AVS SNAPSHOT
[address] [facility] [address] [facility] [address] 
[phone] Patient: Madhav Domingo MRN: [id number] Visit Information Date & Time Provider Department Dept. Phone Encounter #  
 2018  9:15 AM Isha Alatorre MD [facility]ology [phone] [id number] Follow-up Instructions Return in about 4 months (around 2018). Follow-up and Disposition History Your Appointments 2018  8:00 AM  
LAB with CDE NURSE [facility] Appt Note: labs 100 [address] [place] [address] [address] [place] [id number]  
[phone]  
  
   
 [address] [place] [address]  
  
    
 2018  9:00 AM  
ROUTINE CARE with Isha Alatorre MD  
[facility] Appt Note: 4mo fu  
 [address] [facility] [address]  
[phone]  
  
   
 [address] [place] [address] Upcoming Health Maintenance Date Due Pneumococcal 19-64 Medium Risk (1 of 1 - PPSV23) 1988 DTaP/Tdap/Td series (1 - Tdap) 1990 Influenza Age 5 to Adult 2017 HEMOGLOBIN A1C Q6M 2018 MICROALBUMIN Q1 2019 LIPID PANEL Q1 2019 FOOT EXAM Q1 2019 EYE EXAM RETINAL OR DILATED Q1 2019 Allergies as of 2018  Review Complete On: 2018 By: Isha Alatorre MD  
 Not on File Current Immunizations  Never Reviewed No immunizations on file. Not reviewed this visit You Were Diagnosed With   
  
 Codes Comments Uncontrolled type 2 diabetes mellitus with stage 3 chronic kidney disease, without long-term current use of insulin (HCC)    -  Primary ICD-10-CM: E11.22, E11.65, N18.3 ICD-9-CM: 250.52, 585.3 Stage 3 chronic kidney disease     ICD-10-CM: N18.3 ICD-9-CM: 583. 3 Persistent proteinuria     ICD-10-CM: R80.1 ICD-9-CM: 791.0 Essential hypertension     ICD-10-CM: I10 
ICD-9-CM: 401.9 Mixed hyperlipidemia     ICD-10-CM: E78.2 ICD-9-CM: 272.2 Vitals BP Pulse Temp Resp Height(growth percentile) Weight(growth percentile) 107/70 (BP 1 Location: Left arm, BP Patient Position: Sitting) (!) 55 97.1 °F (36.2 °C) (Oral) 18 5' 8\" (1.727 m) 212 lb 14.4 oz (96.6 kg) SpO2 BMI Smoking Status 97% 32.37 kg/m2 Former Smoker Vitals History BMI and BSA Data Body Mass Index Body Surface Area  
 32.37 kg/m 2 2.15 m 2 Preferred Pharmacy Pharmacy Name Phone [id number], [address] [place] [address] [phone] Your Updated Medication List  
  
   
This list is accurate as of: 1/16/18 11:59 PM.  Always use your most recent med list. ALLOPURINOL PO Take 100 mg by mouth daily. atorvastatin 10 mg tablet Commonly known as:  LIPITOR Take 1 Tab by mouth nightly. dulaglutide 0.75 mg/0.5 mL sub-q pen Commonly known as:  TRULICITY  
0.5 mL by SubCUTAneous route every seven (7) days. FISH -160-1,000 mg Cap Generic drug:  omega 3-dha-epa-fish oil Take  by mouth.  
  
 metFORMIN  mg tablet Commonly known as:  GLUCOPHAGE XR Take 1 Tab by mouth two (2) times daily (with meals). valsartan-hydroCHLOROthiazide 320-25 mg per tablet Commonly known as:  DIOVAN-HCT Take 1 Tab by mouth daily. Prescriptions Sent to Pharmacy Refills  
 metFORMIN ER (GLUCOPHAGE XR) 500 mg tablet 4 Sig: Take 1 Tab by mouth two (2) times daily (with meals). Class: Normal  
 Pharmacy: [id number], [address] [place] [address] Ph #: [phone] Route: Oral  
  
We Performed the Following  DIABETES FOOT EXAM [HM7 Custom] REFERRAL TO OPHTHALMOLOGY [REF57 Custom] Comments:  
 He is to see an eye doc Franki Smith [facility] soon Follow-up Instructions Return in about 4 months (around 5/16/2018). To-Do List   
 04/16/2018 Lab:  HEMOGLOBIN A1C WITH EAG   
  
 04/16/2018 Lab:  LIPID PANEL   
  
 04/16/2018 Lab:  METABOLIC PANEL, COMPREHENSIVE   
  
 04/16/2018 Lab:  MICROALBUMIN, UR, RAND W/ MICROALBUMIN/CREA RATIO Referral Information Referral ID Referred By Referred To  
  
 [id number] Huang Mata Not Available Visits Status Start Date End Date 1 Closed 1/16/18 1/16/19 If your referral has a status of pending review or denied, additional information will be sent to support the outcome of this decision. Patient Instructions Do not skip meals Do not eat in between meals Reduce carbs- pasta, rice, potatoes, bread Do not drink juices or sodas Donot eat peanut butter Do not eat sugar free cookies and cakes Do not eat peaches,  Grapes,  Oranges , raisins, and pineapples  
 
---------------------------------------------------------------------------------------------------------------------------------------------------------------------- Stop   Glipizide METFORMIN  Er 500 mg twice a day with food Stay  on  Trulicity 2.13 mg once a week Introducing [facility] & HEALTH SERVICES! [employer] introduces [employer] patient portal. Now you can access parts of your medical record, email your doctor's office, and request medication refills online. 1. In your internet browser, go to [email or web address] 2. Click on the First Time User? Click Here link in the Sign In box. You will see the New Member Sign Up page. 3. Enter your NowThis News Access Code exactly as it appears below. You will not need to use this code after youve completed the sign-up process. If you do not sign up before the expiration date, you must request a new code. · NowThis News Access Code: [email or web address] Expires: 4/16/2018  9:52 AM 
 
 4. Enter the last four digits of your Social Security Number (xxxx) and Date of Birth (mm/dd/yyyy) as indicated and click Submit. You will be taken to the next sign-up page. 5. Create a aitainment ID. This will be your aitainment login ID and cannot be changed, so think of one that is secure and easy to remember. 6. Create a aitainment password. You can change your password at any time. 7. Enter your Password Reset Question and Answer. This can be used at a later time if you forget your password. 8. Enter your e-mail address. You will receive e-mail notification when new information is available in [address] 9. Click Sign Up. You can now view and download portions of your medical record. 10. Click the Download Summary menu link to download a portable copy of your medical information. If you have questions, please visit the Frequently Asked Questions section of the aitainment website. Remember, aitainment is NOT to be used for urgent needs. For medical emergencies, dial 911. Now available from your iPhone and Android! Please provide this summary of care documentation to your next provider. Your primary care clinician is listed as Marisa Castanon. If you have any questions after today's visit, please call [phone].

## 2018-08-07 ENCOUNTER — DOCUMENTATION ONLY (OUTPATIENT)
Dept: ENDOCRINOLOGY | Age: 48
End: 2018-08-07

## 2018-09-24 RX ORDER — VALSARTAN AND HYDROCHLOROTHIAZIDE 320; 25 MG/1; MG/1
1 TABLET, FILM COATED ORAL DAILY
Qty: 30 TAB | Refills: 0 | Status: SHIPPED | OUTPATIENT
Start: 2018-09-24 | End: 2018-10-18 | Stop reason: SDUPTHER

## 2018-09-24 NOTE — TELEPHONE ENCOUNTER
Refill on Diovan 90 day supply sent to [facility] [place]

## 2018-10-19 NOTE — TELEPHONE ENCOUNTER
Please call pt and inquire what his status is with the medication refills ? ? Is he local ?  Or   Did he change to another physician  ?     I am getting refills for meds and he has not had labs for a while and I do not comfortable

## 2018-10-24 ENCOUNTER — TELEPHONE (OUTPATIENT)
Dept: ENDOCRINOLOGY | Age: 48
End: 2018-10-24

## 2018-10-24 DIAGNOSIS — E11.65 TYPE 2 DIABETES MELLITUS WITH HYPERGLYCEMIA, WITHOUT LONG-TERM CURRENT USE OF INSULIN (HCC): Primary | ICD-10-CM

## 2018-10-24 RX ORDER — VALSARTAN AND HYDROCHLOROTHIAZIDE 320; 25 MG/1; MG/1
1 TABLET, FILM COATED ORAL DAILY
Qty: 30 TAB | Refills: 1 | Status: SHIPPED | OUTPATIENT
Start: 2018-10-24 | End: 2018-11-15 | Stop reason: SDUPTHER

## 2018-10-24 NOTE — TELEPHONE ENCOUNTER
----- Message from Solo Campuzano sent at 10/24/2018 11:44 AM EDT -----  Regarding: lab order  Patient coming Nov. 19th for labs and following week for follow up.  Please add orders for lab    Thank you

## 2018-11-14 LAB
ALBUMIN SERPL-MCNC: 4.5 G/DL (ref 3.5–5.5)
ALBUMIN/CREAT UR: 569.6 MG/G CREAT (ref 0–30)
ALBUMIN/GLOB SERPL: 1.7 {RATIO} (ref 1.2–2.2)
ALP SERPL-CCNC: 68 IU/L (ref 39–117)
ALT SERPL-CCNC: 32 IU/L (ref 0–44)
AST SERPL-CCNC: 27 IU/L (ref 0–40)
BILIRUB SERPL-MCNC: 0.3 MG/DL (ref 0–1.2)
BUN SERPL-MCNC: 48 MG/DL (ref 6–24)
BUN/CREAT SERPL: 25 (ref 9–20)
CALCIUM SERPL-MCNC: 9.5 MG/DL (ref 8.7–10.2)
CHLORIDE SERPL-SCNC: 107 MMOL/L (ref 96–106)
CHOLEST SERPL-MCNC: 120 MG/DL (ref 100–199)
CO2 SERPL-SCNC: 22 MMOL/L (ref 20–29)
CREAT SERPL-MCNC: 1.94 MG/DL (ref 0.76–1.27)
CREAT UR-MCNC: 82.6 MG/DL
EST. AVERAGE GLUCOSE BLD GHB EST-MCNC: 111 MG/DL
GLOBULIN SER CALC-MCNC: 2.6 G/DL (ref 1.5–4.5)
GLUCOSE SERPL-MCNC: 80 MG/DL (ref 65–99)
HBA1C MFR BLD: 5.5 % (ref 4.8–5.6)
HDLC SERPL-MCNC: 39 MG/DL
INTERPRETATION, 910389: NORMAL
INTERPRETATION: NORMAL
LDLC SERPL CALC-MCNC: 64 MG/DL (ref 0–99)
Lab: NORMAL
MICROALBUMIN UR-MCNC: 470.5 UG/ML
PDF IMAGE, 910387: NORMAL
POTASSIUM SERPL-SCNC: 4.5 MMOL/L (ref 3.5–5.2)
PROT SERPL-MCNC: 7.1 G/DL (ref 6–8.5)
SODIUM SERPL-SCNC: 144 MMOL/L (ref 134–144)
TRIGL SERPL-MCNC: 87 MG/DL (ref 0–149)
VLDLC SERPL CALC-MCNC: 17 MG/DL (ref 5–40)

## 2018-11-15 RX ORDER — VALSARTAN AND HYDROCHLOROTHIAZIDE 320; 25 MG/1; MG/1
1 TABLET, FILM COATED ORAL DAILY
Qty: 90 TAB | Refills: 4 | Status: SHIPPED | OUTPATIENT
Start: 2018-11-15 | End: 2019-02-27 | Stop reason: SDUPTHER

## 2018-11-26 ENCOUNTER — OFFICE VISIT (OUTPATIENT)
Dept: ENDOCRINOLOGY | Age: 48
End: 2018-11-26

## 2018-11-26 VITALS
BODY MASS INDEX: 33.74 KG/M2 | SYSTOLIC BLOOD PRESSURE: 124 MMHG | DIASTOLIC BLOOD PRESSURE: 78 MMHG | WEIGHT: 222.6 LBS | HEIGHT: 68 IN | RESPIRATION RATE: 18 BRPM | HEART RATE: 72 BPM | TEMPERATURE: 97.7 F | OXYGEN SATURATION: 98 %

## 2018-11-26 DIAGNOSIS — E78.2 MIXED HYPERLIPIDEMIA: ICD-10-CM

## 2018-11-26 DIAGNOSIS — E11.65 TYPE 2 DIABETES MELLITUS WITH HYPERGLYCEMIA, WITHOUT LONG-TERM CURRENT USE OF INSULIN (HCC): Primary | ICD-10-CM

## 2018-11-26 DIAGNOSIS — N18.30 CKD (CHRONIC KIDNEY DISEASE) STAGE 3, GFR 30-59 ML/MIN (HCC): ICD-10-CM

## 2018-11-26 DIAGNOSIS — I10 ESSENTIAL HYPERTENSION: ICD-10-CM

## 2018-11-26 RX ORDER — INDOMETHACIN 50 MG/1
CAPSULE ORAL
Qty: 30 CAP | Refills: 2 | Status: SHIPPED | OUTPATIENT
Start: 2018-11-26 | End: 2019-05-24 | Stop reason: SDUPTHER

## 2018-11-26 RX ORDER — INDOMETHACIN 50 MG/1
CAPSULE ORAL
Refills: 2 | COMMUNITY
Start: 2018-10-08 | End: 2018-12-21 | Stop reason: SDUPTHER

## 2018-11-26 RX ORDER — AMLODIPINE BESYLATE 5 MG/1
5 TABLET ORAL DAILY
Qty: 30 TAB | Refills: 6 | Status: SHIPPED | OUTPATIENT
Start: 2018-11-26 | End: 2019-05-24 | Stop reason: SDUPTHER

## 2018-11-26 NOTE — PROGRESS NOTES
HISTORY OF PRESENT ILLNESS  Mary Ann Terry is a 51 y.o. male. HPI  Patient here for  f/u after last visit of Type 2 diabetes mellitus   From jan 2018     Accompanied by his wife     Gained 10 lbs   He is in a  training program where they run periodic boot camp     He is feeling good   He got indocin low dose once a week   He has tendinitis -- and his feet hurt after work out             Old history :     Lost 11 lbs   He got hospitalized for ARF from Motrin use / rash    He was told to take lesser doses of metformin and was told to stop glipizide   He was seen by Dr. Milan Garcia, nephrologist     He is moving to [facility]          Old history :    Referred : by self/pcp    H/o diabetes for  8  years   Accompanied by wife     Current A1C is 8.7 %  and symptoms/problems include polyuria, polydipsia and visual disturbances     Current diabetic medications include glipizide   5 mg bid , metformin 1000 mg bid , and 500 mg at lunch time . Current monitoring regimen: home blood tests - daily  Home blood sugar records: trend: fluctuating a lot  Any episodes of hypoglycemia? no    Weight trend: stable  Prior visit with dietician: no  Current diet: \"unhealthy\" diet in general  Current exercise: no regular exercise    Known diabetic complications: retinopathy, nephropathy and peripheral neuropathy  Cardiovascular risk factors: dyslipidemia, diabetes mellitus, obesity, sedentary life style, male gender, hypertension    Eye exam current (within one year): no  LAURENCE: no     Past Medical History:   Diagnosis Date    DM (diabetes mellitus) ([facility] [address]     HTN (hypertension)      Past Surgical History:   Procedure Laterality Date    HX APPENDECTOMY       Current Outpatient Medications   Medication Sig    indomethacin (INDOCIN) 50 mg capsule TAKE 1 CAPSULE BY MOUTH THREE TIMES A DAY AS NEEDED    valsartan-hydroCHLOROthiazide (DIOVAN-HCT) 320-25 mg per tablet Take 1 Tab by mouth daily.     dulaglutide (TRULICITY) 2.75 MV/6.4 mL sub-q pen 0.5 mL by SubCUTAneous route every seven (7) days.  metFORMIN ER (GLUCOPHAGE XR) 500 mg tablet Take 1 Tab by mouth two (2) times daily (with meals).  atorvastatin (LIPITOR) 10 mg tablet Take 1 Tab by mouth nightly.  ALLOPURINOL PO Take 100 mg by mouth daily.  omega 3-dha-epa-fish oil (FISH OIL) 100-160-1,000 mg cap Take  by mouth. No current facility-administered medications for this visit. Review of Systems   Constitutional: Negative. HENT: Negative. Eyes: Negative for pain and redness. Respiratory: Negative. Cardiovascular: Negative for chest pain, palpitations and leg swelling. Gastrointestinal: Negative. Negative for constipation. Genitourinary: Negative. Musculoskeletal: Negative for myalgias. Skin: Negative. Neurological: Negative. Endo/Heme/Allergies: Negative. Psychiatric/Behavioral: Negative for depression and memory loss. The patient does not have insomnia. Physical Exam   Constitutional: He is oriented to person, place, and time. He appears well-developed and well-nourished. HENT:   Head: Normocephalic. Eyes: Conjunctivae and EOM are normal. Pupils are equal, round, and reactive to light. Neck: Normal range of motion. Neck supple. No JVD present. No tracheal deviation present. No thyromegaly present. Cardiovascular: Normal rate, regular rhythm and normal heart sounds. Pulmonary/Chest: Effort normal and breath sounds normal.   Abdominal: Soft. Bowel sounds are normal.   Musculoskeletal: Normal range of motion. Lymphadenopathy:     He has no cervical adenopathy. Neurological: He is alert and oriented to person, place, and time. He has normal reflexes. Skin: Skin is warm. Psychiatric: He has a normal mood and affect.      Diabetic foot exam: jan 2018    Left: Reflexes nd     Vibratory sensation normal    Proprioception nd   Sharp/dull discrimination nd    Filament test normal sensation with micro filament   Pulse DP: 2+ (normal)   Pulse PT: nd   Deformities: None  Right: Reflexes nd   Vibratory sensation normal   Proprioception nd   Sharp/dull discrimination nd   Filament test normal sensation with micro filament   Pulse DP: 2+ (normal)   Pulse PT: nd   Deformities: None          Lab Results   Component Value Date/Time    Hemoglobin A1c 5.5 11/12/2018 11:31 AM    Hemoglobin A1c 5.4 01/08/2018 09:18 AM    Hemoglobin A1c 6.4 (H) 09/08/2017 12:23 PM    Glucose 80 11/12/2018 11:31 AM    Microalb/Creat ratio (ug/mg creat.) 569.6 (H) 11/12/2018 11:31 AM    LDL, calculated 64 11/12/2018 11:31 AM    Creatinine 1.94 (H) 11/12/2018 11:31 AM      Lab Results   Component Value Date/Time    Cholesterol, total 120 11/12/2018 11:31 AM    HDL Cholesterol 39 (L) 11/12/2018 11:31 AM    LDL, calculated 64 11/12/2018 11:31 AM    Triglyceride 87 11/12/2018 11:31 AM     Lab Results   Component Value Date/Time    ALT (SGPT) 32 11/12/2018 11:31 AM    AST (SGOT) 27 11/12/2018 11:31 AM    Alk. phosphatase 68 11/12/2018 11:31 AM    Bilirubin, total 0.3 11/12/2018 11:31 AM    Albumin 4.5 11/12/2018 11:31 AM    Protein, total 7.1 11/12/2018 11:31 AM       Lab Results   Component Value Date/Time    GFR est non-AA 39 (L) 11/12/2018 11:31 AM    GFR est AA 46 (L) 11/12/2018 11:31 AM    Creatinine 1.94 (H) 11/12/2018 11:31 AM    BUN 48 (H) 11/12/2018 11:31 AM    Sodium 144 11/12/2018 11:31 AM    Potassium 4.5 11/12/2018 11:31 AM    Chloride 107 (H) 11/12/2018 11:31 AM    CO2 22 11/12/2018 11:31 AM         ASSESSMENT and PLAN    1.  Type 2 DM, uncontrolled :  a1c   Is   5.5 %     From nov 2018      Compared to    5.4 % compared to  a1c 6.4 %     From sept 2017    Compared to   7.7 %   From July 2017   Compared to  over 10 %    He has been doing  well   Held off  Metformin  For ARF ,   but started him back on it at 500 mg twice a day     Wife expressed concern over being on it   His egfr is 39 ml/min, creat is at 1.9     Explained the importance of metformin and current FDA updates     His renal insuff - some component  is pre renal from his anti-hypertensive   And hoping by reducing the dose , he can be on metformin with less supervision    Stopped Glipizide to 5  mg bid for ARF at hospital   He is able to manage well without it     Stay  on trulicity ONLY    Patient is advised about checking blood sugars 2 times a day and maintaining log book. The danger of having low blood sugars has been explained with inappropriate use of insulin  Patient voiced understanding and using the printed instructions at home. Hypoglycemia management has been explained to the patient. 2. Hypoglycemia :  Educated on treating the hypoglycemia. Decreased Glipizide use and prescribed    3. HTN : well controlled, continue diovan-hctz. He is on 320-25 mg a day   Reduce to half dose and I added norvasc   Will do labs to see the creat improvement   Patient is educated about importance of compliance with anti-hypertensives especially ARB/ACEI    4. Dyslipidemia : continue lipitor . Patient is educated about benefits and adverse effects of statins and explained how benefits outweigh risk. 5. use of aspirin to prevent MI and TIA's discussed      6. Diabetic complications :     A. Retinopathy-YES   educated on this complication,  regular f/u with ophthalmologist encouraged    B. Nephropathy - YES    Stage 3 ckd   Stable   Significant Proteinuria present    educated on this disease and indicated stages and its prognosis. Regular care with nephrologist encouraged    C.  Peripheral Neuropathy - YES  , mild  educated on this disease and indicated improvement with good and stable glycemic control            > 50 % of time is spent on counseling   Patient voiced understanding her plan of care

## 2018-11-26 NOTE — PROGRESS NOTES
1. Have you been to the ER, urgent care clinic since your last visit? Hospitalized since your last visit? No    2. Have you seen or consulted any other health care providers outside of the [address] [place] [address] since your last visit? Include any pap smears or colon screening.  No  Chief Complaint   Patient presents with    Diabetes     follow up     Wt Readings from Last 3 Encounters:   11/26/18 222 lb 9.6 oz (101 kg)   01/16/18 212 lb 14.4 oz (96.6 kg)   09/08/17 233 lb (105.7 kg)     Temp Readings from Last 3 Encounters:   11/26/18 97.7 °F (36.5 °C) (Oral)   01/16/18 97.1 °F (36.2 °C) (Oral)   09/08/17 97.5 °F (36.4 °C) (Oral)     BP Readings from Last 3 Encounters:   11/26/18 124/78   01/16/18 107/70   09/08/17 117/74     Pulse Readings from Last 3 Encounters:   11/26/18 72   01/16/18 (!) 55   09/08/17 81

## 2018-11-26 NOTE — PATIENT INSTRUCTIONS
Do not skip meals  Do not eat in between meals    Reduce carbs- pasta, rice, potatoes, bread   Do not drink juices or sodas  Donot eat peanut butter     Do not eat sugar free cookies and cakes   Do not eat peaches,  Grapes,  Oranges , raisins, and pineapples     ----------------------------------------------------------------------------------------------------------------------------------------------------------------------    Stop   Glipizide     METFORMIN  Er 500 mg twice a day with food     Stay  on  Trulicity 0.51 mg once a week      Stop  diovan- hctz 300 -25 mg  ,   Start on  160-12.5 mg a day      Refill indocin ( [facility])      Start on norvasc 5 mg a day  ( print )      -------------------------------------------------------------------------------------------------------

## 2018-12-21 RX ORDER — INDOMETHACIN 50 MG/1
CAPSULE ORAL
Qty: 90 CAP | Refills: 1 | OUTPATIENT
Start: 2018-12-21

## 2018-12-21 RX ORDER — ATORVASTATIN CALCIUM 10 MG/1
10 TABLET, FILM COATED ORAL
Qty: 90 TAB | Refills: 4 | Status: SHIPPED | OUTPATIENT
Start: 2018-12-21 | End: 2019-05-24 | Stop reason: SDUPTHER

## 2019-02-27 RX ORDER — VALSARTAN AND HYDROCHLOROTHIAZIDE 320; 25 MG/1; MG/1
1 TABLET, FILM COATED ORAL DAILY
Qty: 30 TAB | Refills: 2 | Status: SHIPPED | OUTPATIENT
Start: 2019-02-27 | End: 2019-05-24 | Stop reason: ALTCHOICE

## 2019-02-27 NOTE — TELEPHONE ENCOUNTER
Patient requesting a refill for Diovan to be called into the [facility]. appt scheduled for May 2019. .Thank you

## 2019-04-11 RX ORDER — METFORMIN HYDROCHLORIDE 500 MG/1
500 TABLET, EXTENDED RELEASE ORAL 2 TIMES DAILY WITH MEALS
Qty: 180 TAB | Refills: 4 | Status: SHIPPED | OUTPATIENT
Start: 2019-04-11 | End: 2019-05-24 | Stop reason: ALTCHOICE

## 2019-05-19 LAB
ALBUMIN SERPL-MCNC: 4.5 G/DL (ref 3.5–5.5)
ALBUMIN/CREAT UR: 294.7 MG/G CREAT (ref 0–30)
ALBUMIN/GLOB SERPL: 1.9 {RATIO} (ref 1.2–2.2)
ALP SERPL-CCNC: 48 IU/L (ref 39–117)
ALT SERPL-CCNC: 23 IU/L (ref 0–44)
AST SERPL-CCNC: 28 IU/L (ref 0–40)
BILIRUB SERPL-MCNC: 0.3 MG/DL (ref 0–1.2)
BUN SERPL-MCNC: 49 MG/DL (ref 6–24)
BUN/CREAT SERPL: 21 (ref 9–20)
CALCIUM SERPL-MCNC: 9.5 MG/DL (ref 8.7–10.2)
CHLORIDE SERPL-SCNC: 104 MMOL/L (ref 96–106)
CHOLEST SERPL-MCNC: 133 MG/DL (ref 100–199)
CO2 SERPL-SCNC: 24 MMOL/L (ref 20–29)
CREAT SERPL-MCNC: 2.36 MG/DL (ref 0.76–1.27)
CREAT UR-MCNC: 81.7 MG/DL
EST. AVERAGE GLUCOSE BLD GHB EST-MCNC: 105 MG/DL
GLOBULIN SER CALC-MCNC: 2.4 G/DL (ref 1.5–4.5)
GLUCOSE SERPL-MCNC: 82 MG/DL (ref 65–99)
HBA1C MFR BLD: 5.3 % (ref 4.8–5.6)
HDLC SERPL-MCNC: 48 MG/DL
INTERPRETATION, 910389: NORMAL
INTERPRETATION: NORMAL
LDLC SERPL CALC-MCNC: 68 MG/DL (ref 0–99)
Lab: NORMAL
MICROALBUMIN UR-MCNC: 240.8 UG/ML
PDF IMAGE, 910387: NORMAL
POTASSIUM SERPL-SCNC: 4.5 MMOL/L (ref 3.5–5.2)
PROT SERPL-MCNC: 6.9 G/DL (ref 6–8.5)
SODIUM SERPL-SCNC: 142 MMOL/L (ref 134–144)
TRIGL SERPL-MCNC: 86 MG/DL (ref 0–149)
VLDLC SERPL CALC-MCNC: 17 MG/DL (ref 5–40)

## 2019-05-24 ENCOUNTER — OFFICE VISIT (OUTPATIENT)
Dept: ENDOCRINOLOGY | Age: 49
End: 2019-05-24

## 2019-05-24 VITALS
HEIGHT: 68 IN | OXYGEN SATURATION: 97 % | RESPIRATION RATE: 14 BRPM | WEIGHT: 197.9 LBS | BODY MASS INDEX: 29.99 KG/M2 | DIASTOLIC BLOOD PRESSURE: 73 MMHG | HEART RATE: 61 BPM | TEMPERATURE: 97.7 F | SYSTOLIC BLOOD PRESSURE: 118 MMHG

## 2019-05-24 DIAGNOSIS — E11.65 TYPE 2 DIABETES MELLITUS WITH HYPERGLYCEMIA, WITHOUT LONG-TERM CURRENT USE OF INSULIN (HCC): Primary | ICD-10-CM

## 2019-05-24 DIAGNOSIS — R80.1 PERSISTENT PROTEINURIA: ICD-10-CM

## 2019-05-24 DIAGNOSIS — I10 ESSENTIAL HYPERTENSION: ICD-10-CM

## 2019-05-24 DIAGNOSIS — N18.30 CKD (CHRONIC KIDNEY DISEASE) STAGE 3, GFR 30-59 ML/MIN (HCC): ICD-10-CM

## 2019-05-24 DIAGNOSIS — E78.2 MIXED HYPERLIPIDEMIA: ICD-10-CM

## 2019-05-24 RX ORDER — AMLODIPINE BESYLATE 5 MG/1
5 TABLET ORAL DAILY
Qty: 90 TAB | Refills: 4 | Status: SHIPPED | OUTPATIENT
Start: 2019-05-24 | End: 2019-11-22 | Stop reason: SDUPTHER

## 2019-05-24 RX ORDER — ATORVASTATIN CALCIUM 10 MG/1
10 TABLET, FILM COATED ORAL
Qty: 90 TAB | Refills: 4 | Status: SHIPPED | OUTPATIENT
Start: 2019-05-24 | End: 2019-11-22 | Stop reason: SDUPTHER

## 2019-05-24 RX ORDER — INDOMETHACIN 50 MG/1
CAPSULE ORAL
Qty: 30 CAP | Refills: 2 | Status: SHIPPED | OUTPATIENT
Start: 2019-05-24

## 2019-05-24 RX ORDER — VALSARTAN 80 MG/1
80 TABLET ORAL DAILY
Qty: 90 TAB | Refills: 4 | Status: SHIPPED | OUTPATIENT
Start: 2019-05-24 | End: 2019-11-22 | Stop reason: SDUPTHER

## 2019-05-24 NOTE — PATIENT INSTRUCTIONS
Do not skip meals  Do not eat in between meals    Reduce carbs- pasta, rice, potatoes, bread   Do not drink juices or sodas  Donot eat peanut butter     Do not eat sugar free cookies and cakes   Do not eat peaches,  Grapes,  Oranges , raisins, and pineapples     ---------------------------------------------------------------------------------------------------------------------------    Stop   Glipizide     stop  METFORMIN  Er  500 mg     Stay  on  Trulicity 2.20 mg once a week      Stop diovan- hctz    START   ON   Plain diovan 80 mg a day       STAY  on norvasc 5 mg a day  ( print )      -------------------------------------------------------------------------------------------------------

## 2019-05-24 NOTE — LETTER
NOTIFICATION RETURN TO WORK / SCHOOL 
 
5/24/2019 2:28 PM 
 
Mr. Brandee Boykin [address] [id number] To Whom It May Concern: 
 
Brandee Boykin is currently under the care of [id number] [place] [address]. He will return to work/school on: 05/25/2019. If there are questions or concerns please have the patient contact our office. Sincerely, Samantha Shay MD

## 2019-05-24 NOTE — LETTER
5/25/19 Patient: Faustino Resendez YOB: 1970 Date of Visit: 5/24/2019 [facility] [id number] [place] [address] VIA Facsimile: [phone] Dear Zahida Schwartz DO, Thank you for referring Mr. Jose Lazaro to [address] [place] [address] for evaluation. My notes for this consultation are attached. If you have questions, please do not hesitate to call me. I look forward to following your patient along with you. Sincerely, Shay Robbins MD

## 2019-05-24 NOTE — PROGRESS NOTES
HISTORY OF PRESENT ILLNESS  Gerarda Jeans is a 47 y.o. male. HPI  Patient here for  f/u after last visit of Type 2 diabetes mellitus   From jan 2019      Lost 25 lbs   He runs [employer] a day   He has done well   occasinally feels slightly low sugars        Old hsitory :     Accompanied by his wife     Gained 10 lbs   He is in a  training program where they run periodic boot camp     He is feeling good   He got indocin low dose once a week   He has tendinitis -- and his feet hurt after work out         Old history :     Lost 11 lbs   He got hospitalized for ARF from Motrin use / rash    He was told to take lesser doses of metformin and was told to stop glipizide   He was seen by Dr. Maria Eugenia Doll, nephrologist     He is moving to [facility]          Old history :    Referred : by self/pcp    H/o diabetes for  8  years   Accompanied by wife     Current A1C is 8.7 %  and symptoms/problems include polyuria, polydipsia and visual disturbances     Current diabetic medications include glipizide   5 mg bid , metformin 1000 mg bid , and 500 mg at lunch time . Current monitoring regimen: home blood tests - daily  Home blood sugar records: trend: fluctuating a lot  Any episodes of hypoglycemia? no    Weight trend: stable  Prior visit with dietician: no  Current diet: \"unhealthy\" diet in general  Current exercise: no regular exercise    Known diabetic complications: retinopathy, nephropathy and peripheral neuropathy  Cardiovascular risk factors: dyslipidemia, diabetes mellitus, obesity, sedentary life style, male gender, hypertension    Eye exam current (within one year): no  LAURENCE: no     Past Medical History:   Diagnosis Date    DM [facility]di[facility]) [facility]     HTN (hypertension)      Past Surgical History:   Procedure Laterality Date    HX APPENDECTOMY       Current Outpatient Medications   Medication Sig    metFORMIN ER (GLUCOPHAGE XR) 500 mg tablet Take 1 Tab by mouth two (2) times daily (with meals).     valsartan-hydroCHLOROthiazide (DIOVAN-HCT) 320-25 mg per tablet Take 1 Tab by mouth daily.  atorvastatin (LIPITOR) 10 mg tablet Take 1 Tab by mouth nightly.  indomethacin (INDOCIN) 50 mg capsule Once a day as needed    amLODIPine (NORVASC) 5 mg tablet Take 1 Tab by mouth daily.  dulaglutide (TRULICITY) 4.98 LJ/7.8 mL sub-q pen 0.5 mL by SubCUTAneous route every seven (7) days.  omega 3-dha-epa-fish oil (FISH OIL) 100-160-1,000 mg cap Take  by mouth.  ALLOPURINOL PO Take 100 mg by mouth daily. No current facility-administered medications for this visit. Review of Systems   Constitutional: Negative. HENT: Negative. Eyes: Negative for pain and redness. Respiratory: Negative. Cardiovascular: Negative for chest pain, palpitations and leg swelling. Gastrointestinal: Negative. Negative for constipation. Genitourinary: Negative. Musculoskeletal: Negative for myalgias. Skin: Negative. Neurological: Negative. Endo/Heme/Allergies: Negative. Psychiatric/Behavioral: Negative for depression and memory loss. The patient does not have insomnia. Physical Exam   Constitutional: He is oriented to person, place, and time. He appears well-developed and well-nourished. HENT:   Head: Normocephalic. Eyes: Conjunctivae and EOM are normal. Pupils are equal, round, and reactive to light. Neck: Normal range of motion. Neck supple. No JVD present. No tracheal deviation present. No thyromegaly present. Cardiovascular: Normal rate, regular rhythm and normal heart sounds. Pulmonary/Chest: Effort normal and breath sounds normal.   Abdominal: Soft. Bowel sounds are normal.   Musculoskeletal: Normal range of motion. Lymphadenopathy:     He has no cervical adenopathy. Neurological: He is alert and oriented to person, place, and time. He has normal reflexes. Skin: Skin is warm. Psychiatric: He has a normal mood and affect.      Diabetic foot exam: jan 2018    Left: Reflexes nd     Vibratory sensation normal    Proprioception nd   Sharp/dull discrimination nd    Filament test normal sensation with micro filament   Pulse DP: 2+ (normal)   Pulse PT: nd   Deformities: None  Right: Reflexes nd   Vibratory sensation normal   Proprioception nd   Sharp/dull discrimination nd   Filament test normal sensation with micro filament   Pulse DP: 2+ (normal)   Pulse PT: nd   Deformities: None          Lab Results   Component Value Date/Time    Hemoglobin A1c 5.3 05/18/2019 09:22 AM    Hemoglobin A1c 5.5 11/12/2018 11:31 AM    Hemoglobin A1c 5.4 01/08/2018 09:18 AM    Glucose 82 05/18/2019 09:22 AM    Microalb/Creat ratio (ug/mg creat.) 294.7 (H) 05/18/2019 09:22 AM    LDL, calculated 68 05/18/2019 09:22 AM    Creatinine 2.36 (H) 05/18/2019 09:22 AM      Lab Results   Component Value Date/Time    Cholesterol, total 133 05/18/2019 09:22 AM    HDL Cholesterol 48 05/18/2019 09:22 AM    LDL, calculated 68 05/18/2019 09:22 AM    Triglyceride 86 05/18/2019 09:22 AM     Lab Results   Component Value Date/Time    ALT (SGPT) 23 05/18/2019 09:22 AM    AST (SGOT) 28 05/18/2019 09:22 AM    Alk. phosphatase 48 05/18/2019 09:22 AM    Bilirubin, total 0.3 05/18/2019 09:22 AM    Albumin 4.5 05/18/2019 09:22 AM    Protein, total 6.9 05/18/2019 09:22 AM       Lab Results   Component Value Date/Time    GFR est non-AA 31 (L) 05/18/2019 09:22 AM    GFR est AA 36 (L) 05/18/2019 09:22 AM    Creatinine 2.36 (H) 05/18/2019 09:22 AM    BUN 49 (H) 05/18/2019 09:22 AM    Sodium 142 05/18/2019 09:22 AM    Potassium 4.5 05/18/2019 09:22 AM    Chloride 104 05/18/2019 09:22 AM    CO2 24 05/18/2019 09:22 AM         ASSESSMENT and PLAN    1.  Type 2 DM, uncontrolled :  a1c   Is  5.1 %     From may 2019   Compared to    5.5 %     From nov 2018      Compared to    5.4 % compared to  a1c 6.4 %     From sept 2017    Compared to   7.7 %   From July 2017   Compared to  over 10 %    He has been doing  well   Held off Metformin  For egfr at 31 ml/min   Stay  on trulicity ONLY    Patient is advised about checking blood sugars 2 times a day and maintaining log book. The danger of having low blood sugars has been explained with inappropriate use of insulin  Patient voiced understanding and using the printed instructions at home. Hypoglycemia management has been explained to the patient. 2. Hypoglycemia :  Educated on treating the hypoglycemia. 3. HTN : well controlled, CHANGED TO DIOVAN 80 MG A DAY   AND NORVASC  5 MG A DAY   Patient is educated about importance of compliance with anti-hypertensives especially ARB/ACEI    4. Dyslipidemia : continue lipitor . Patient is educated about benefits and adverse effects of statins and explained how benefits outweigh risk. 5. use of aspirin to prevent MI and TIA's discussed      6. Diabetic complications :     A. Retinopathy-YES   educated on this complication,  regular f/u with ophthalmologist encouraged    B. Nephropathy - YES    Stage 3 ckd   EGFR FELL DOWN TO 31ML/MIN     SO, STOP METFORMIN ER   AND   LOWER DIOVAN TO 80 MG A DAY ( stopped 320/25 half pill a day dosing )    Stable   Stable Proteinuria present    educated on this disease and indicated stages and its prognosis. Regular care with nephrologist encouraged    C.  Peripheral Neuropathy - YES  , mild  educated on this disease and indicated improvement with good and stable glycemic control            > 50 % of time is spent on counseling   Patient voiced understanding her plan of care

## 2019-05-24 NOTE — PROGRESS NOTES
Nick Howard is a 47 y.o. male here for   Chief Complaint   Patient presents with    Diabetes       Functional glucose monitor and record keeping system? - yes  Eye exam within last year? - on file  Foot exam within last year? - on file    1. Have you been to the ER, urgent care clinic since your last visit? Hospitalized since your last visit? -no    2. Have you seen or consulted any other health care providers outside of the [address] [place] [address] since your last visit? Include any pap smears or colon screening. -PCP

## 2019-11-22 RX ORDER — VALSARTAN 80 MG/1
80 TABLET ORAL DAILY
Qty: 90 TAB | Refills: 1 | Status: SHIPPED | OUTPATIENT
Start: 2019-11-22 | End: 2022-05-31

## 2019-11-22 RX ORDER — AMLODIPINE BESYLATE 5 MG/1
5 TABLET ORAL DAILY
Qty: 90 TAB | Refills: 1 | Status: SHIPPED | OUTPATIENT
Start: 2019-11-22 | End: 2022-05-31

## 2019-11-22 RX ORDER — VALSARTAN 80 MG/1
80 TABLET ORAL DAILY
Qty: 90 TAB | Refills: 0 | Status: SHIPPED | OUTPATIENT
Start: 2019-11-22 | End: 2019-11-22 | Stop reason: SDUPTHER

## 2019-11-22 RX ORDER — ATORVASTATIN CALCIUM 10 MG/1
10 TABLET, FILM COATED ORAL
Qty: 90 TAB | Refills: 0 | Status: SHIPPED | OUTPATIENT
Start: 2019-11-22 | End: 2019-11-22 | Stop reason: SDUPTHER

## 2019-11-22 RX ORDER — AMLODIPINE BESYLATE 5 MG/1
5 TABLET ORAL DAILY
Qty: 90 TAB | Refills: 0 | Status: SHIPPED | OUTPATIENT
Start: 2019-11-22 | End: 2019-11-22 | Stop reason: SDUPTHER

## 2019-11-22 RX ORDER — ATORVASTATIN CALCIUM 10 MG/1
10 TABLET, FILM COATED ORAL
Qty: 90 TAB | Refills: 1 | Status: SHIPPED | OUTPATIENT
Start: 2019-11-22

## 2020-07-27 RX ORDER — DULAGLUTIDE 0.75 MG/.5ML
0.75 INJECTION, SOLUTION SUBCUTANEOUS
Qty: 12 PEN | Refills: 0 | Status: SHIPPED | OUTPATIENT
Start: 2020-07-27 | End: 2022-05-31

## 2020-08-31 RX ORDER — VALSARTAN 80 MG/1
80 TABLET ORAL DAILY
Qty: 90 TAB | Refills: 0 | OUTPATIENT
Start: 2020-08-31

## 2022-03-18 PROBLEM — N18.30 STAGE 3 CHRONIC KIDNEY DISEASE (HCC): Status: ACTIVE | Noted: 2018-01-16

## 2022-03-18 PROBLEM — I10 ESSENTIAL HYPERTENSION: Status: ACTIVE | Noted: 2018-01-16

## 2022-03-18 PROBLEM — E78.2 MIXED HYPERLIPIDEMIA: Status: ACTIVE | Noted: 2018-01-16

## 2022-03-19 PROBLEM — R80.1 PERSISTENT PROTEINURIA: Status: ACTIVE | Noted: 2018-01-16

## 2022-05-31 ENCOUNTER — HOSPITAL ENCOUNTER (OUTPATIENT)
Dept: PREADMISSION TESTING | Age: 52
Discharge: HOME | End: 2022-05-31
Payer: COMMERCIAL

## 2022-05-31 VITALS
RESPIRATION RATE: 18 BRPM | SYSTOLIC BLOOD PRESSURE: 115 MMHG | DIASTOLIC BLOOD PRESSURE: 75 MMHG | BODY MASS INDEX: 27.53 KG/M2 | TEMPERATURE: 97.9 F | HEART RATE: 81 BPM | HEIGHT: 68 IN | WEIGHT: 181.66 LBS

## 2022-05-31 LAB
ALBUMIN SERPL-MCNC: 3.3 G/DL (ref 3.5–5)
ALBUMIN/GLOB SERPL: 0.8 {RATIO} (ref 1.1–2.2)
ALP SERPL-CCNC: 70 U/L (ref 45–117)
ALT SERPL-CCNC: 14 U/L (ref 12–78)
ANION GAP SERPL CALC-SCNC: 6 MMOL/L (ref 5–15)
APTT PPP: 29.8 SEC (ref 22.1–31)
AST SERPL-CCNC: 14 U/L (ref 15–37)
BASOPHILS # BLD: 0.1 K/UL (ref 0–0.1)
BASOPHILS NFR BLD: 2 % (ref 0–1)
BILIRUB SERPL-MCNC: 0.2 MG/DL (ref 0.2–1)
BUN SERPL-MCNC: 67 MG/DL (ref 6–20)
BUN/CREAT SERPL: 7 (ref 12–20)
CALCIUM SERPL-MCNC: 9.2 MG/DL (ref 8.5–10.1)
CHLORIDE SERPL-SCNC: 98 MMOL/L (ref 97–108)
CO2 SERPL-SCNC: 32 MMOL/L (ref 21–32)
CREAT SERPL-MCNC: 10.1 MG/DL (ref 0.7–1.3)
DIFFERENTIAL METHOD BLD: ABNORMAL
EOSINOPHIL # BLD: 1.4 K/UL (ref 0–0.4)
EOSINOPHIL NFR BLD: 20 % (ref 0–7)
ERYTHROCYTE [DISTWIDTH] IN BLOOD BY AUTOMATED COUNT: 11.9 % (ref 11.5–14.5)
EST. AVERAGE GLUCOSE BLD GHB EST-MCNC: 100 MG/DL
GLOBULIN SER CALC-MCNC: 4 G/DL (ref 2–4)
GLUCOSE SERPL-MCNC: 104 MG/DL (ref 65–100)
HBA1C MFR BLD: 5.1 % (ref 4–5.6)
HCT VFR BLD AUTO: 26.1 % (ref 36.6–50.3)
HGB BLD-MCNC: 8.1 G/DL (ref 12.1–17)
IMM GRANULOCYTES # BLD AUTO: 0.1 K/UL (ref 0–0.04)
IMM GRANULOCYTES NFR BLD AUTO: 1 % (ref 0–0.5)
INR PPP: 1 (ref 0.9–1.1)
LYMPHOCYTES # BLD: 1.1 K/UL (ref 0.8–3.5)
LYMPHOCYTES NFR BLD: 16 % (ref 12–49)
MCH RBC QN AUTO: 28.9 PG (ref 26–34)
MCHC RBC AUTO-ENTMCNC: 31 G/DL (ref 30–36.5)
MCV RBC AUTO: 93.2 FL (ref 80–99)
MONOCYTES # BLD: 0.6 K/UL (ref 0–1)
MONOCYTES NFR BLD: 9 % (ref 5–13)
NEUTS SEG # BLD: 3.5 K/UL (ref 1.8–8)
NEUTS SEG NFR BLD: 52 % (ref 32–75)
NRBC # BLD: 0 K/UL (ref 0–0.01)
NRBC BLD-RTO: 0 PER 100 WBC
PLATELET # BLD AUTO: 232 K/UL (ref 150–400)
PMV BLD AUTO: 9.5 FL (ref 8.9–12.9)
POTASSIUM SERPL-SCNC: 4.1 MMOL/L (ref 3.5–5.1)
PROT SERPL-MCNC: 7.3 G/DL (ref 6.4–8.2)
PROTHROMBIN TIME: 10.4 SEC (ref 9–11.1)
RBC # BLD AUTO: 2.8 M/UL (ref 4.1–5.7)
RBC MORPH BLD: ABNORMAL
SODIUM SERPL-SCNC: 136 MMOL/L (ref 136–145)
THERAPEUTIC RANGE,PTTT: NORMAL SECS (ref 58–77)
WBC # BLD AUTO: 6.8 K/UL (ref 4.1–11.1)

## 2022-05-31 PROCEDURE — 36415 COLL VENOUS BLD VENIPUNCTURE: CPT

## 2022-05-31 PROCEDURE — 83036 HEMOGLOBIN GLYCOSYLATED A1C: CPT

## 2022-05-31 PROCEDURE — 85025 COMPLETE CBC W/AUTO DIFF WBC: CPT

## 2022-05-31 PROCEDURE — 80053 COMPREHEN METABOLIC PANEL: CPT

## 2022-05-31 PROCEDURE — 85730 THROMBOPLASTIN TIME PARTIAL: CPT

## 2022-05-31 PROCEDURE — 85610 PROTHROMBIN TIME: CPT

## 2022-05-31 RX ORDER — SEVELAMER CARBONATE 800 MG/1
1600 TABLET, FILM COATED ORAL
COMMUNITY

## 2022-05-31 RX ORDER — PANTOPRAZOLE SODIUM 40 MG/1
40 GRANULE, DELAYED RELEASE ORAL
Status: ON HOLD | COMMUNITY
End: 2022-06-07

## 2022-05-31 RX ORDER — HYDRALAZINE HYDROCHLORIDE 25 MG/1
25 TABLET, FILM COATED ORAL 2 TIMES DAILY
COMMUNITY

## 2022-05-31 RX ORDER — CALCITRIOL 0.25 UG/1
0.25 CAPSULE ORAL
COMMUNITY

## 2022-05-31 NOTE — PERIOP NOTES
[address] INSTRUCTIONS    Surgery Date:   06-    [facility] staff will call you between 4 PM- 8 PM the day before surgery with your arrival time. If your surgery is on a Monday, we will call you the preceding Friday. Please call [phone] after 8 PM if you did not receive your arrival time. 1. Please report to [facility] Patient Access/Admitting on the 1st floor. Bring your insurance card, photo identification, and any copayment ( if applicable). 2. If you are going home the same day of your surgery, you must have a responsible adult to drive you home. You need to have a responsible adult to stay with you the first 24 hours after surgery and you should not drive a car for 24 hours following your surgery. 3. Nothing to eat or drink after midnight the night before surgery. This includes no water, gum, mints, coffee, juice, etc.  Please note special instructions, if applicable, below for medications. 4. Do NOT drink alcohol or smoke 24 hours before surgery. STOP smoking for 14 days prior as it helps with breathing and healing after surgery. 5. If you are being admitted to the hospital, please leave personal belongings/luggage in your car until you have an assigned hospital room number. 6. Please wear comfortable clothes. Wear your glasses instead of contacts. We ask that all money, jewelry and valuables be left at home. Wear no make up, particularly mascara, the day of surgery. 7.  All body piercings, rings, and jewelry need to be removed and left at home. Please remove any nail polish or artificial nails from your fingernails. Please wear your hair loose or down. Please no pony-tails, buns, or any metal hair accessories. If you shower the morning of surgery, please do not apply any lotions or powders afterwards. You may wear deodorant, unless having breast surgery. Do not shave any body area within 24 hours of your surgery.   8. Please follow all instructions to avoid any potential surgical cancellation. 9. Should your physical condition change, (i.e. fever, cold, flu, etc.) please notify your surgeon as soon as possible. 10. It is important to be on time. If a situation occurs where you may be delayed, please call:  [phone] / [phone] on the day of surgery. 11. The Preadmission Testing staff can be reached at [phone]. 12. Special instructions: NONE      Current Outpatient Medications   Medication Sig    darbepoetin onofre (ARANESP) 60 mcg/0.3 mL injection by SubCUTAneous route every Monday, Wednesday, Friday.  pantoprazole (Protonix) 40 mg granules for oral suspension 40 mg Daily (before breakfast).  sevelamer carbonate (RENVELA) 800 mg tab tab Take 1,600 mg by mouth three (3) times daily (with meals).  hydrALAZINE (APRESOLINE) 25 mg tablet Take 25 mg by mouth two (2) times a day.  calcitRIOL (ROCALTROL) 0.25 mcg capsule Take 0.25 mcg by mouth every morning.  atorvastatin (LIPITOR) 10 mg tablet Take 1 Tab by mouth nightly.  indomethacin (INDOCIN) 50 mg capsule Once a day as needed     No current facility-administered medications for this encounter. 1. YOU MUST ONLY TAKE THESE MEDICATIONS THE MORNING OF SURGERY WITH A SIP OF WATER: PANTOPRAZOLE,HYDRALAZINE, RENVELA  2. MEDICATIONS TO TAKE THE MORNING OF SURGERY ONLY IF NEEDED: NONE  3. HOLD these prescription medications BEFORE Surgery: NONE  4. Ask your surgeon/prescribing physician about when/if to STOP taking these medications: NONE  5. Stop all vitamins, herbal medicines and Aspirin containing products 7 days prior to surgery. Stop any non-steroidal anti-inflammatory drugs (i.e. Ibuprofen, Naproxen, Advil, Aleve) 3 days before surgery. You may take Tylenol. 6. If you are currently taking Plavix, Coumadin, or any other blood-thinning/anticoagulant medication contact your prescribing physician for instructions.     Preventing Infections Before and After  Your Surgery    IMPORTANT INSTRUCTIONS      You play an important role in your health and preparation for surgery. To reduce the germs on your skin you will need to shower with CHG soap (Chorhexidine gluconate 4%) two times before surgery. CHG soap (Hibiclens, Hex-A-Clens or store brand)   CHG soap will be provided at your Preadmission Testing (PAT) appointment.  If you do not have a PAT appointment before surgery, you may arrange to  CHG soap from our office or purchase CHG soap at a pharmacy, grocery or department store.  You need to purchase TWO 4 ounce bottles to use for your 2 showers. Steps to follow:  1. Wash your hair with your normal shampoo and your body with regular soap and rinse well to remove shampoo and soap from your skin. 2. Wet a clean washcloth and turn off the shower. 3. Put CHG soap on washcloth and apply to your entire body from the neck down. Do not use on your head, face or private parts(genitals). Do not use CHG soap on open sores, wounds or areas of skin irritation. 4. Wash you body gently for 5 minutes. Do not wash your skin too hard. This soap does not create lather. Pay special attention to your underarms and from your belly button to your feet. 5. Turn the shower back on and rinse well to get CHG soap off your body. 6. Pat your skin dry with a clean, dry towel. Do not apply lotions or moisturizer. 7. Put on clean clothes and sleep on fresh bed sheets and do not allow pets to sleep with you. Shower with CHG soap 2 times before your surgery   The evening before your surgery   The morning of your surgery      Tips to help prevent infections after your surgery:  1. Protect your surgical wound from germs:  ? Hand washing is the most important thing you and your caregivers can do to prevent infections. ? Keep your bandage clean and dry! ? Do not touch your surgical wound.   2. Use clean, freshly washed towels and washcloths every time you shower; do not share bath linens with others. 3. Until your surgical wound is healed, wear clothing and sleep on bed linens each day that are clean and freshly washed. 4. Do not allow pets to sleep in your bed with you or touch your surgical wound. 5. Do not smoke  smoking delays wound healing. This may be a good time to stop smoking. 6. If you have diabetes, it is important for you to manage your blood sugar levels properly before your surgery as well as after your surgery. Poorly managed blood sugar levels slow down wound healing and prevent you from healing completely. Patient Information Regarding COVID Restrictions    Day of Procedure     Please park in the parking deck or any designated visitor parking lot.  Enter the facility through the [facility].   On the day of surgery, please provide the cell phone number of the person who will be waiting for you to the Patient Access representative at the time of registration.  Please wear a mask on the day of your procedure.  We are now allowing two designated visitors per stay. Pediatric patients may have 2 designated visitors. These two people may come in with you on the day of your procedure.  No visitors under the age of 13.  The designated visitor must also wear a mask.  Once your procedure and the immediate recovery period is completed, a nurse in the recovery area will contact your designated visitor to inform them of your room number or to otherwise review other pertinent information regarding your care.  Social distancing practices are to be adhered to in waiting areas and the cafeteria. The patient was contacted  in person. He verbalized understanding of all instructions does not  need reinforcement.

## 2022-05-31 NOTE — PERIOP NOTES
PRE-OP INSTRUCTIONS AND MEDICATION PRINTED AND REVIEWED WITH PATIENT. TWO BOTTLES OF CHG SOAP GIVEN    COPY OF COVID CARD ON CHART    SIGNED CONSENT FORM ON CHART FOR SCHEDULED PROCEDURE ON 06- WITH DR. MEADE    FAXED SIGN REQUEST TO  RECEIVE EKG FROM [facility].   RECEIVED EKG PLACED ON CHART

## 2022-06-01 NOTE — PERIOP NOTES
LAB RESULTS ROUTED VIA CC FAX TO PCP, DR. Parth Munoz    FAXED REQUEST FOR EKG FROM 4/2022 TO [facility], [address] CALLED ABNORMAL LAB RESULTS (CMP, CBC) TO DR. MEADE'S OFFICE. LEFT A MESSAGE FOR THE NURSE.    6/2/22 11:30AM - NURSE LETA CALLED BACK AND STATED THEY HAD RECEIVED LAB RESULTS AND THAT DR. MEADE HAS SEEN THEM.

## 2022-06-06 ENCOUNTER — ANESTHESIA EVENT (OUTPATIENT)
Dept: CARDIOTHORACIC SURGERY | Age: 52
End: 2022-06-06
Payer: COMMERCIAL

## 2022-06-07 ENCOUNTER — ANESTHESIA (OUTPATIENT)
Dept: CARDIOTHORACIC SURGERY | Age: 52
End: 2022-06-07
Payer: COMMERCIAL

## 2022-06-07 ENCOUNTER — HOSPITAL ENCOUNTER (OUTPATIENT)
Age: 52
Setting detail: OUTPATIENT SURGERY
Discharge: HOME | End: 2022-06-07
Attending: SURGERY | Admitting: SURGERY
Payer: COMMERCIAL

## 2022-06-07 VITALS
TEMPERATURE: 98.3 F | RESPIRATION RATE: 12 BRPM | WEIGHT: 181.66 LBS | DIASTOLIC BLOOD PRESSURE: 73 MMHG | OXYGEN SATURATION: 100 % | SYSTOLIC BLOOD PRESSURE: 113 MMHG | HEIGHT: 68 IN | HEART RATE: 62 BPM | BODY MASS INDEX: 27.53 KG/M2

## 2022-06-07 DIAGNOSIS — N18.30 STAGE 3 CHRONIC KIDNEY DISEASE, UNSPECIFIED WHETHER STAGE 3A OR 3B CKD (HCC): Primary | ICD-10-CM

## 2022-06-07 LAB
ABO + RH BLD: NORMAL
ANION GAP BLD CALC-SCNC: 12 MMOL/L (ref 10–20)
BLOOD GROUP ANTIBODIES SERPL: NORMAL
CA-I BLD-MCNC: 1.04 MMOL/L (ref 1.12–1.32)
CHLORIDE BLD-SCNC: 103 MMOL/L (ref 98–107)
CO2 BLD-SCNC: 26.9 MMOL/L (ref 21–32)
CREAT BLD-MCNC: 9.26 MG/DL (ref 0.6–1.3)
GLUCOSE BLD STRIP.AUTO-MCNC: 88 MG/DL (ref 65–117)
GLUCOSE BLD-MCNC: 72 MG/DL (ref 65–100)
POTASSIUM BLD-SCNC: 3.9 MMOL/L (ref 3.5–5.1)
SERVICE CMNT-IMP: ABNORMAL
SERVICE CMNT-IMP: NORMAL
SODIUM BLD-SCNC: 141 MMOL/L (ref 136–145)
SPECIMEN EXP DATE BLD: NORMAL

## 2022-06-07 PROCEDURE — 74011250636 HC RX REV CODE- 250/636: Performed by: NURSE ANESTHETIST, CERTIFIED REGISTERED

## 2022-06-07 PROCEDURE — 77030040361 HC SLV COMPR DVT MDII -B: Performed by: SURGERY

## 2022-06-07 PROCEDURE — 74011250636 HC RX REV CODE- 250/636: Performed by: SURGERY

## 2022-06-07 PROCEDURE — 80047 BASIC METABLC PNL IONIZED CA: CPT

## 2022-06-07 PROCEDURE — 82962 GLUCOSE BLOOD TEST: CPT

## 2022-06-07 PROCEDURE — 77030020829: Performed by: SURGERY

## 2022-06-07 PROCEDURE — 76210000006 HC OR PH I REC 0.5 TO 1 HR: Performed by: SURGERY

## 2022-06-07 PROCEDURE — 74011000250 HC RX REV CODE- 250: Performed by: NURSE ANESTHETIST, CERTIFIED REGISTERED

## 2022-06-07 PROCEDURE — 2709999900 HC NON-CHARGEABLE SUPPLY: Performed by: SURGERY

## 2022-06-07 PROCEDURE — 77030002933 HC SUT MCRYL J&J -A: Performed by: SURGERY

## 2022-06-07 PROCEDURE — 76060000033 HC ANESTHESIA 1 TO 1.5 HR: Performed by: SURGERY

## 2022-06-07 PROCEDURE — 76210000020 HC REC RM PH II FIRST 0.5 HR: Performed by: SURGERY

## 2022-06-07 PROCEDURE — C1750 CATH, HEMODIALYSIS,LONG-TERM: HCPCS | Performed by: SURGERY

## 2022-06-07 PROCEDURE — 77030013079 HC BLNKT BAIR HGGR 3M -A: Performed by: ANESTHESIOLOGY

## 2022-06-07 PROCEDURE — 74011000250 HC RX REV CODE- 250: Performed by: SURGERY

## 2022-06-07 PROCEDURE — 77030026438 HC STYL ET INTUB CARD -A: Performed by: ANESTHESIOLOGY

## 2022-06-07 PROCEDURE — 36415 COLL VENOUS BLD VENIPUNCTURE: CPT

## 2022-06-07 PROCEDURE — 77030031139 HC SUT VCRL2 J&J -A: Performed by: SURGERY

## 2022-06-07 PROCEDURE — 77030035029 HC NDL INSUF VERES DISP COVD -B: Performed by: SURGERY

## 2022-06-07 PROCEDURE — 77030010507 HC ADH SKN DERMBND J&J -B: Performed by: SURGERY

## 2022-06-07 PROCEDURE — 77030012770 HC TRCR OPT FX AMR -B: Performed by: SURGERY

## 2022-06-07 PROCEDURE — 77030008771 HC TU NG SALEM SUMP -A: Performed by: ANESTHESIOLOGY

## 2022-06-07 PROCEDURE — 74011250637 HC RX REV CODE- 250/637: Performed by: ANESTHESIOLOGY

## 2022-06-07 PROCEDURE — 77030006674 HC BLD MYRIN GYRS -B: Performed by: SURGERY

## 2022-06-07 PROCEDURE — 77030004495 HC ADPT PERI DLYS BAXT -C: Performed by: SURGERY

## 2022-06-07 PROCEDURE — 76010000149 HC OR TIME 1 TO 1.5 HR: Performed by: SURGERY

## 2022-06-07 PROCEDURE — 77030008684 HC TU ET CUF COVD -B: Performed by: ANESTHESIOLOGY

## 2022-06-07 PROCEDURE — 86900 BLOOD TYPING SEROLOGIC ABO: CPT

## 2022-06-07 RX ORDER — HYDROMORPHONE HYDROCHLORIDE 1 MG/ML
0.5 INJECTION, SOLUTION INTRAMUSCULAR; INTRAVENOUS; SUBCUTANEOUS
Status: DISCONTINUED | OUTPATIENT
Start: 2022-06-07 | End: 2022-06-07 | Stop reason: HOSPADM

## 2022-06-07 RX ORDER — MIDAZOLAM HYDROCHLORIDE 1 MG/ML
INJECTION, SOLUTION INTRAMUSCULAR; INTRAVENOUS AS NEEDED
Status: DISCONTINUED | OUTPATIENT
Start: 2022-06-07 | End: 2022-06-07 | Stop reason: HOSPADM

## 2022-06-07 RX ORDER — ROCURONIUM BROMIDE 10 MG/ML
INJECTION, SOLUTION INTRAVENOUS AS NEEDED
Status: DISCONTINUED | OUTPATIENT
Start: 2022-06-07 | End: 2022-06-07 | Stop reason: HOSPADM

## 2022-06-07 RX ORDER — LIDOCAINE HYDROCHLORIDE 10 MG/ML
0.1 INJECTION, SOLUTION EPIDURAL; INFILTRATION; INTRACAUDAL; PERINEURAL AS NEEDED
Status: DISCONTINUED | OUTPATIENT
Start: 2022-06-07 | End: 2022-06-07 | Stop reason: HOSPADM

## 2022-06-07 RX ORDER — SODIUM CHLORIDE 9 MG/ML
25 INJECTION, SOLUTION INTRAVENOUS CONTINUOUS
Status: DISCONTINUED | OUTPATIENT
Start: 2022-06-07 | End: 2022-06-07 | Stop reason: HOSPADM

## 2022-06-07 RX ORDER — ACETAMINOPHEN 325 MG/1
650 TABLET ORAL ONCE
Status: COMPLETED | OUTPATIENT
Start: 2022-06-07 | End: 2022-06-07

## 2022-06-07 RX ORDER — SODIUM CHLORIDE 0.9 % (FLUSH) 0.9 %
5-40 SYRINGE (ML) INJECTION EVERY 8 HOURS
Status: DISCONTINUED | OUTPATIENT
Start: 2022-06-07 | End: 2022-06-07 | Stop reason: HOSPADM

## 2022-06-07 RX ORDER — FENTANYL CITRATE 50 UG/ML
50 INJECTION, SOLUTION INTRAMUSCULAR; INTRAVENOUS AS NEEDED
Status: DISCONTINUED | OUTPATIENT
Start: 2022-06-07 | End: 2022-06-07 | Stop reason: HOSPADM

## 2022-06-07 RX ORDER — DIPHENHYDRAMINE HYDROCHLORIDE 50 MG/ML
12.5 INJECTION, SOLUTION INTRAMUSCULAR; INTRAVENOUS AS NEEDED
Status: DISCONTINUED | OUTPATIENT
Start: 2022-06-07 | End: 2022-06-07 | Stop reason: HOSPADM

## 2022-06-07 RX ORDER — SODIUM CHLORIDE 0.9 % (FLUSH) 0.9 %
5-40 SYRINGE (ML) INJECTION AS NEEDED
Status: DISCONTINUED | OUTPATIENT
Start: 2022-06-07 | End: 2022-06-07 | Stop reason: HOSPADM

## 2022-06-07 RX ORDER — MIDAZOLAM HYDROCHLORIDE 1 MG/ML
0.5 INJECTION, SOLUTION INTRAMUSCULAR; INTRAVENOUS
Status: DISCONTINUED | OUTPATIENT
Start: 2022-06-07 | End: 2022-06-07 | Stop reason: HOSPADM

## 2022-06-07 RX ORDER — PHENYLEPHRINE HCL IN 0.9% NACL 0.4MG/10ML
SYRINGE (ML) INTRAVENOUS AS NEEDED
Status: DISCONTINUED | OUTPATIENT
Start: 2022-06-07 | End: 2022-06-07 | Stop reason: HOSPADM

## 2022-06-07 RX ORDER — PROPOFOL 10 MG/ML
INJECTION, EMULSION INTRAVENOUS AS NEEDED
Status: DISCONTINUED | OUTPATIENT
Start: 2022-06-07 | End: 2022-06-07 | Stop reason: HOSPADM

## 2022-06-07 RX ORDER — LIDOCAINE HYDROCHLORIDE 10 MG/ML
INJECTION INFILTRATION; PERINEURAL AS NEEDED
Status: DISCONTINUED | OUTPATIENT
Start: 2022-06-07 | End: 2022-06-07 | Stop reason: HOSPADM

## 2022-06-07 RX ORDER — ONDANSETRON 2 MG/ML
4 INJECTION INTRAMUSCULAR; INTRAVENOUS AS NEEDED
Status: DISCONTINUED | OUTPATIENT
Start: 2022-06-07 | End: 2022-06-07 | Stop reason: HOSPADM

## 2022-06-07 RX ORDER — SODIUM CHLORIDE 9 MG/ML
INJECTION, SOLUTION INTRAVENOUS
Status: DISCONTINUED | OUTPATIENT
Start: 2022-06-07 | End: 2022-06-07 | Stop reason: HOSPADM

## 2022-06-07 RX ORDER — MORPHINE SULFATE 2 MG/ML
2 INJECTION, SOLUTION INTRAMUSCULAR; INTRAVENOUS
Status: DISCONTINUED | OUTPATIENT
Start: 2022-06-07 | End: 2022-06-07 | Stop reason: HOSPADM

## 2022-06-07 RX ORDER — TRAMADOL HYDROCHLORIDE 50 MG/1
50 TABLET ORAL
Qty: 21 TABLET | Refills: 0 | Status: SHIPPED | OUTPATIENT
Start: 2022-06-07 | End: 2022-06-12

## 2022-06-07 RX ORDER — SODIUM CHLORIDE, SODIUM LACTATE, POTASSIUM CHLORIDE, CALCIUM CHLORIDE 600; 310; 30; 20 MG/100ML; MG/100ML; MG/100ML; MG/100ML
100 INJECTION, SOLUTION INTRAVENOUS CONTINUOUS
Status: DISCONTINUED | OUTPATIENT
Start: 2022-06-07 | End: 2022-06-07 | Stop reason: HOSPADM

## 2022-06-07 RX ORDER — ROPIVACAINE HYDROCHLORIDE 5 MG/ML
30 INJECTION, SOLUTION EPIDURAL; INFILTRATION; PERINEURAL AS NEEDED
Status: DISCONTINUED | OUTPATIENT
Start: 2022-06-07 | End: 2022-06-07 | Stop reason: HOSPADM

## 2022-06-07 RX ORDER — MIDAZOLAM HYDROCHLORIDE 1 MG/ML
1 INJECTION, SOLUTION INTRAMUSCULAR; INTRAVENOUS AS NEEDED
Status: DISCONTINUED | OUTPATIENT
Start: 2022-06-07 | End: 2022-06-07 | Stop reason: HOSPADM

## 2022-06-07 RX ORDER — FENTANYL CITRATE 50 UG/ML
INJECTION, SOLUTION INTRAMUSCULAR; INTRAVENOUS AS NEEDED
Status: DISCONTINUED | OUTPATIENT
Start: 2022-06-07 | End: 2022-06-07 | Stop reason: HOSPADM

## 2022-06-07 RX ORDER — DEXAMETHASONE SODIUM PHOSPHATE 4 MG/ML
INJECTION, SOLUTION INTRA-ARTICULAR; INTRALESIONAL; INTRAMUSCULAR; INTRAVENOUS; SOFT TISSUE AS NEEDED
Status: DISCONTINUED | OUTPATIENT
Start: 2022-06-07 | End: 2022-06-07 | Stop reason: HOSPADM

## 2022-06-07 RX ORDER — LIDOCAINE HYDROCHLORIDE 20 MG/ML
INJECTION, SOLUTION EPIDURAL; INFILTRATION; INTRACAUDAL; PERINEURAL AS NEEDED
Status: DISCONTINUED | OUTPATIENT
Start: 2022-06-07 | End: 2022-06-07 | Stop reason: HOSPADM

## 2022-06-07 RX ORDER — FENTANYL CITRATE 50 UG/ML
25 INJECTION, SOLUTION INTRAMUSCULAR; INTRAVENOUS
Status: DISCONTINUED | OUTPATIENT
Start: 2022-06-07 | End: 2022-06-07 | Stop reason: HOSPADM

## 2022-06-07 RX ORDER — ONDANSETRON 2 MG/ML
INJECTION INTRAMUSCULAR; INTRAVENOUS AS NEEDED
Status: DISCONTINUED | OUTPATIENT
Start: 2022-06-07 | End: 2022-06-07 | Stop reason: HOSPADM

## 2022-06-07 RX ADMIN — Medication 80 MCG: at 08:13

## 2022-06-07 RX ADMIN — ACETAMINOPHEN 650 MG: 325 TABLET ORAL at 06:50

## 2022-06-07 RX ADMIN — ROCURONIUM BROMIDE 40 MG: 10 SOLUTION INTRAVENOUS at 07:36

## 2022-06-07 RX ADMIN — FENTANYL CITRATE 50 MCG: 50 INJECTION, SOLUTION INTRAMUSCULAR; INTRAVENOUS at 07:36

## 2022-06-07 RX ADMIN — WATER 2 G: 1 INJECTION INTRAMUSCULAR; INTRAVENOUS; SUBCUTANEOUS at 07:49

## 2022-06-07 RX ADMIN — Medication 80 MCG: at 07:51

## 2022-06-07 RX ADMIN — ONDANSETRON HYDROCHLORIDE 4 MG: 2 INJECTION, SOLUTION INTRAMUSCULAR; INTRAVENOUS at 08:26

## 2022-06-07 RX ADMIN — PROPOFOL 200 MG: 10 INJECTION, EMULSION INTRAVENOUS at 07:36

## 2022-06-07 RX ADMIN — FENTANYL CITRATE 50 MCG: 50 INJECTION, SOLUTION INTRAMUSCULAR; INTRAVENOUS at 07:30

## 2022-06-07 RX ADMIN — DEXAMETHASONE SODIUM PHOSPHATE 4 MG: 4 INJECTION, SOLUTION INTRAMUSCULAR; INTRAVENOUS at 07:36

## 2022-06-07 RX ADMIN — SODIUM CHLORIDE: 900 INJECTION, SOLUTION INTRAVENOUS at 07:30

## 2022-06-07 RX ADMIN — LIDOCAINE HYDROCHLORIDE 80 MG: 20 INJECTION, SOLUTION EPIDURAL; INFILTRATION; INTRACAUDAL; PERINEURAL at 07:36

## 2022-06-07 RX ADMIN — MIDAZOLAM 2 MG: 1 INJECTION INTRAMUSCULAR; INTRAVENOUS at 07:30

## 2022-06-07 RX ADMIN — Medication 80 MCG: at 08:20

## 2022-06-07 RX ADMIN — SUGAMMADEX 200 MG: 100 INJECTION, SOLUTION INTRAVENOUS at 08:26

## 2022-06-07 RX ADMIN — Medication 80 MCG: at 08:01

## 2022-06-07 NOTE — OP NOTES
Operative Report    Patient: Arlette Andino MRN: [id number]  SSN: [id number]    YOB: 1970  Age: 47 y.o. Sex: male       Date of Surgery: 6/7/2022     Preoperative Diagnosis: END STAGE RENAL     Postoperative Diagnosis: END STAGE RENAL     Surgeon(s) and Role:     * Nicole Cabrales MD - Primary    Anesthesia: General     Procedure: Procedure(s):  INSERTION OF LAPAROSCOPIC PERITONEAL DIALYSIS CATHETER     Findings: A few minor adhesions to the low abdominal midline. The abdominal cavity itself was suitable for peritoneal dialysis catheter placement. Procedure in Detail: The patient was transported to the operating room. The patient was placed supine on the operating room table. Intubation and monitoring per anesthesia. The patient was prepped and draped in a standard fashion. An appropriate surgical timeout was performed with all members of the team present. The patient was administered 2gm Ancef for the antibiotic prophylaxis. Using Veress needle and a towel clamp in the LUQ entry into the abdominal cavity was obtained. This was confirmed with a saline drop test. Low volume insufflation was initiated with low pressures. The abdomen was insufflated. An optiview port was then placed. The abdominal cavity was examined and found suitable for peritoneal dialysis catheter placement. A small incision lateral to the umbilicus was made. Dissection was carried onto the anterior fascia. A small incision was made in this. Using a tunneling device under direct visualization the peritoneal dialysis catheter was tunneled within the rectus sheath. The posterior sheath and peritoneal were entered under direct visualization. The catheter was then advanced and placed into the pelvis. The anterior sheath was closed using a 2-0 prolene. A small subcutaneous tunnel was created. A counter incision was made and the catheter was pulled through. The incision was closed with a 3-0 vicryl followed by a 4-0 monocryl. The catheter exit site was approximated with a 4-0 monocryl. The abdomen was once again examined. The insufflation was vented through the ports. These were then withdrawn. The port sitewas then closed using a 4-0 monocryl and dressed with Dermabond. The catheter was dressed in a sterile manner. The patient tolerated the procedure very well. He was successfully extubated and transported to the recovery area in stable condition. Estimated Blood Loss:  15cc    Tourniquet Time: * No tourniquets in log *      Implants: * No implants in log *            Specimens: * No specimens in log *        Drains: None                Complications: None    Counts: Sponge and needle counts were correct times two.     Signed By:  Audrey Dutta MD     June 7, 2022

## 2022-06-07 NOTE — ANESTHESIA PREPROCEDURE EVALUATION
Relevant Problems   CARDIOVASCULAR   (+) Essential hypertension      RENAL FAILURE   (+) Stage 3 chronic kidney disease (HCC)      ENDOCRINE   (+) Uncontrolled type 2 diabetes mellitus with stage 3 chronic kidney disease, without long-term current use of insulin       Anesthetic History   No history of anesthetic complications            Review of Systems / Medical History  Patient summary reviewed, nursing notes reviewed and pertinent labs reviewed    Pulmonary  Within defined limits                 Neuro/Psych   Within defined limits           Cardiovascular  Within defined limits  Hypertension              Exercise tolerance: >4 METS     GI/Hepatic/Renal         Renal disease: ESRD       Endo/Other  Within defined limits  Diabetes         Other Findings              Physical Exam    Airway  Mallampati: II  TM Distance: > 6 cm  Neck ROM: normal range of motion   Mouth opening: Normal     Cardiovascular  Regular rate and rhythm,  S1 and S2 normal,  no murmur, click, rub, or gallop             Dental  No notable dental hx       Pulmonary  Breath sounds clear to auscultation               Abdominal  GI exam deferred       Other Findings            Anesthetic Plan    ASA: 3  Anesthesia type: general          Induction: Intravenous  Anesthetic plan and risks discussed with: Patient

## 2022-06-07 NOTE — ANESTHESIA POSTPROCEDURE EVALUATION
Procedure(s):  INSERTION OF LAPAROSCOPIC PERITONEAL DIALYSIS CATHETER. general    Anesthesia Post Evaluation        Patient location during evaluation: PACU  Note status: Adequate. Level of consciousness: responsive to verbal stimuli and sleepy but conscious  Pain management: satisfactory to patient  Airway patency: patent  Anesthetic complications: no  Cardiovascular status: acceptable  Respiratory status: acceptable  Hydration status: acceptable  Comments: +Post-Anesthesia Evaluation and Assessment    Patient: Edy Wright MRN: [id number]  SSN: [id number]   YOB: 1970  Age: 47 y.o. Sex: male          Cardiovascular Function/Vital Signs    /73   Pulse 60   Temp 37.1 °C (98.8 °F)   Resp 15   Ht 5' 8\" (1.727 m)   Wt 82.4 kg (181 lb 10.5 oz)   SpO2 100%   BMI 27.62 kg/m²     Patient is status post Procedure(s):  INSERTION OF LAPAROSCOPIC PERITONEAL DIALYSIS CATHETER. Nausea/Vomiting: Controlled. Postoperative hydration reviewed and adequate. Pain:  Pain Scale 1: Numeric (0 - 10) (06/07/22 0839)  Pain Intensity 1: 0 (06/07/22 0839)   Managed. Neurological Status:   Neuro (WDL): Exceptions to WDL (drowsy from anesthesia) (06/07/22 0839)   At baseline. Mental Status and Level of Consciousness: Arousable. Pulmonary Status:   O2 Device: CO2 nasal cannula (06/07/22 0839)   Adequate oxygenation and airway patent. Complications related to anesthesia: None    Post-anesthesia assessment completed. No concerns. I have evaluated the patient and the patient is stable and ready to be discharged from PACU . Signed By: Dinorah Hanna MD    6/7/2022        INITIAL Post-op Vital signs:   Vitals Value Taken Time   /73 06/07/22 0900   Temp 37.1 °C (98.8 °F) 06/07/22 0839   Pulse 62 06/07/22 0907   Resp 12 06/07/22 0907   SpO2 100 % 06/07/22 0907   Vitals shown include unvalidated device data.

## 2022-06-07 NOTE — ROUTINE PROCESS
Patient: Lety Silva MRN: [id number]  SSN: [id number]   YOB: 1970  Age: 47 y.o. Sex: male     Patient is status post Procedure(s):  INSERTION OF LAPAROSCOPIC PERITONEAL DIALYSIS CATHETER. Surgeon(s) and Role:     * Luc Eid MD - Primary    Local/Dose/Irrigation:  20ml per [facility]                  Peripheral IV 06/07/22 Anterior; Left Forearm (Active)        Hemodialysis Access  (Active)                       Dressing/Packing:  Incision 06/07/22 Abdomen Anterior; Left-Dressing/Treatment: Gauze dressing/dressing sponge;Skin glue;Tape/Soft cloth adhesive tape (06/07/22 0826)    Splint/Cast:  ]    Other:

## 2022-06-07 NOTE — PROGRESS NOTES
06/07/22 0754   Family Communication   Family Update Message Procedure started   Delivery Origin Nurse    Relationship to Patient Son    Phone Number Ken Reilly  [phone]   Family/Significant Other Update Called

## 2022-06-07 NOTE — DISCHARGE INSTRUCTIONS
Patient Education        Peritoneal Dialysis Catheter Placement: What to Expect at [address]     Peritoneal dialysis catheter placement is a surgery to place a soft tube (catheter) in an area of your belly (peritoneum) for dialysis. Dialysis does the work of your kidneys when they fail. Your doctor made a small cut (incision) in your belly. Then your doctor placed a catheter through the cut and into your belly. The cut was closed with stitches. The end of the catheter exits out of your belly. In 10 to 14 days you'll start dialysis. Dialysis fluid will flow into your belly through the catheter. It stays there for several hours. Then it flows out of your belly through the catheter. Keep the bandage around the catheter dry, and don't take it off. Be careful not to bump or move the catheter. You may have some pain for a few days. You may also feel tired and sick to your stomach. Be sure to rest and drink plenty of fluids. This care sheet gives you a general idea about how long it will take for you to recover. But each person recovers at a different pace. Follow the steps below to get better as quickly as possible. How can you care for yourself at home? Activity    · Do not lift heavy objects.     · Limit physical activity. Ask your doctor when it's okay to do your normal activities.     · Do not swim until your doctor says it's okay. Diet    · Avoid constipation and straining. Drink plenty of water. Your doctor may suggest fiber, a stool softener, or a mild laxative. Medicines    · If you take aspirin or some other blood thinner, ask your doctor if and when to start taking it again. Make sure that you understand exactly what your doctor wants you to do. Incision care    · You will have a dressing over the cut (incision). A dressing helps the incision heal and protects it. Do not change or care for the dressing.  Dressing changes will be done by your care team until the cut heals.     · Keep the dressing clean and dry.     · Try not to bump or move the catheter until the cut heals. Hygiene    · Use a washcloth to clean your body. Be careful to not get the bandage wet.     · Ask your doctor when it's okay to shower or take a bath. Catheter care after the incision heals    · Always wash your hands before and after you touch the catheter.     · Clean your catheter site daily with antibacterial soap. Wear gloves. Pat dry with a clean towel.     · Apply antibacterial ointment to clean the skin around the catheter. Your care team will tell you what ointment to use and how to apply it. Replace with a clean dressing.     · Always clean and dry your catheter and access area right away after you get wet.     · Fasten or tape the catheter to your body to keep it from catching on your clothes.     · Never use scissors or other sharp objects around your catheter.     · Store your dialysis supplies in a cool, dry place. Follow-up care is a key part of your treatment and safety. Be sure to make and go to all appointments, and call your doctor if you are having problems. It's also a good idea to know your test results and keep a list of the medicines you take. When should you call for help? Call your doctor now or seek immediate medical care if:    · You have symptoms of infection, such as:  ? Increased pain, swelling, warmth, or redness around the cut.  ? Red streaks leading from the cut.  ? Pus draining from the cut.  ? A fever.     · You have belly pain.     · You have nausea or vomiting. Watch closely for changes in your health, and be sure to contact your doctor if:    · The dialysis fluid looks cloudy or is a different color.     · Fluid does not flow through the catheter.     · The dialysis equipment isn't working. Where can you learn more?   Go to http://www.gray.com/  Enter P012 in the search box to learn more about \"Peritoneal Dialysis Catheter Placement: What to Expect at Home.\"  Current as of: September 8, 2021               Content Version: 13.2  © 2173-9846 [employer]. Care instructions adapted under license by [employer] (which disclaims liability or warranty for this information). If you have questions about a medical condition or this instruction, always ask your healthcare professional. [address] any warranty or liability for your use of this information. DISCHARGE SUMMARY from Nurse    PATIENT INSTRUCTIONS:    After general anesthesia or intravenous sedation, for 24 hours or while taking prescription Narcotics:  · Limit your activities  · Do not drive and operate hazardous machinery  · Do not make important personal or business decisions  · Do  not drink alcoholic beverages  · If you have not urinated within 8 hours after discharge, please contact your surgeon on call. Report the following to your surgeon:  · Excessive pain, swelling, redness or odor of or around the surgical area  · Temperature over 100.5  · Nausea and vomiting lasting longer than 4 hours or if unable to take medications  · Any signs of decreased circulation or nerve impairment to extremity: change in color, persistent  numbness, tingling, coldness or increase pain  · Any questions    What to do at Home:  Recommended activity: Activity as tolerated      *  Please give a list of your current medications to your Primary Care Provider. *  Please update this list whenever your medications are discontinued, doses are      changed, or new medications (including over-the-counter products) are added. *  Please carry medication information at all times in case of emergency situations. These are general instructions for a healthy lifestyle:    No smoking/ No tobacco products/ Avoid exposure to second hand smoke  Surgeon General's Warning:  Quitting smoking now greatly reduces serious risk to your health.     Obesity, smoking, and sedentary lifestyle greatly increases your risk for illness    A healthy diet, regular physical exercise & weight monitoring are important for maintaining a healthy lifestyle    You may be retaining fluid if you have a history of heart failure or if you experience any of the following symptoms:  Weight gain of 3 pounds or more overnight or 5 pounds in a week, increased swelling in our hands or feet or shortness of breath while lying flat in bed. Please call your doctor as soon as you notice any of these symptoms; do not wait until your next office visit. The discharge information has been reviewed with the patient and son. The patient and son verbalized understanding. Discharge medications reviewed with the patient and son and appropriate educational materials and side effects teaching were provided.   ___________________________________________________________________________________________________________________________________

## 2022-06-07 NOTE — H&P
History and Physical    Patient: Deena Johns MRN: [id number]  SSN: [id number]    YOB: 1970  Age: 47 y.o. Sex: male      Subjective:      Deena Johns is a 47 y.o. male who presents for lap PD catheter placement. Past Medical History:   Diagnosis Date    Chronic kidney disease     [facility]     End stage kidney disease ([facility]     Hemodialysis patient ([facility] [address] [facility]     HTN (hypertension)     Personal history of COVID-19      Past Surgical History:   Procedure Laterality Date    HX APPENDECTOMY      HX VASCULAR ACCESS      right chest jessie      Family History   Problem Relation Age of Onset    Diabetes Mother     Diabetes Father     No Known Problems Daughter     No Known Problems Son     No Known Problems Son     Anesth Problems Neg Hx      Social History     Tobacco Use    Smoking status: Former Smoker     Years: 10.00    Smokeless tobacco: Never Used   Substance Use Topics    Alcohol use: Never      Prior to Admission medications    Medication Sig Start Date End Date Taking? Authorizing Provider   darbepoetin onofre (ARANESP) 60 mcg/0.3 mL injection by SubCUTAneous route every Monday, Wednesday, Friday. Yes Provider, Historical   sevelamer carbonate (RENVELA) 800 mg tab tab Take 1,600 mg by mouth three (3) times daily (with meals). Yes Provider, Historical   hydrALAZINE (APRESOLINE) 25 mg tablet Take 25 mg by mouth two (2) times a day. Yes Provider, Historical   atorvastatin (LIPITOR) 10 mg tablet Take 1 Tab by mouth nightly. 11/22/19  Yes Neris El MD   calcitRIOL (ROCALTROL) 0.25 mcg capsule Take 0.25 mcg by mouth every morning.  Patient states he is getting infusions of vitamin D during dialysis instead of taking pills    Provider, Historical   indomethacin (INDOCIN) 50 mg capsule Once a day as needed 5/24/19   Neris El MD        No Known Allergies    Review of Systems:  A comprehensive review of systems was negative except for that written in the History of Present Illness. Objective:     Vitals:    06/07/22 0608   BP: 126/74   Pulse: 68   Resp: 16   Temp: 98 °F (36.7 °C)   SpO2: 98%   Weight: 82.4 kg (181 lb 10.5 oz)   Height: 5' 8\" (1.727 m)        Physical Exam:  General: Patient is pleasant and cooperative and appears to be in no acute distress. HEENT: Normocephalic atraumatic. Appropriate tracking. No scleral icterus. Nares patent. Trachea is midline. Chest: Unlabored respirations. Symmetrical chest expansion. Cardiac: Regular rate and rhythm. Acyanotic  Abdomen: Abdomen is soft, nontender, nondistended. Extremities: Moves all extremities.   Vascular:  Abdomen soft NT ND     Assessment:     Hospital Problems  Date Reviewed: 5/24/2019    None       CKD    Plan:     Lap PD catheter placement    Signed By: Duane Murdoch, MD     June 7, 2022

## 2022-07-08 NOTE — PERIOP NOTES
[address] INSTRUCTIONS    Surgery Date:   7/12/22    Your surgeon's office or [facility] staff will call you between 4 PM- 8 PM the day before surgery with your arrival time. If your surgery is on a Monday, you will receive a call the preceding Friday. 1. Please report to [facility] Patient Access/Admitting on the 1st floor. Bring your insurance card, photo identification, and any copayment ( if applicable). 2. If you are going home the same day of your surgery, you must have a responsible adult to drive you home. You need to have a responsible adult to stay with you the first 24 hours after surgery and you should not drive a car for 24 hours following your surgery. 3. Nothing to eat or drink after midnight the night before surgery. This includes no water, gum, mints, coffee, juice, etc.  Please note special instructions, if applicable, below for medications. 4. Do NOT drink alcohol or smoke 24 hours before surgery. STOP smoking for 14 days prior as it helps with breathing and healing after surgery. 5. If you are being admitted to the hospital, please leave personal belongings/luggage in your car until you have an assigned hospital room number. 6. Please wear comfortable clothes. Wear your glasses instead of contacts. We ask that all money, jewelry and valuables be left at home. Wear no make up, particularly mascara, the day of surgery. 7.  All body piercings, rings, and jewelry need to be removed and left at home. Please remove any nail polish or artificial nails from your fingernails. Please wear your hair loose or down. Please no pony-tails, buns, or any metal hair accessories. If you shower the morning of surgery, please do not apply any lotions or powders afterwards. You may wear deodorant, unless having breast surgery. Do not shave any body area within 24 hours of your surgery. 8. Please follow all instructions to avoid any potential surgical cancellation.   9. Should your physical condition change, (i.e. fever, cold, flu, etc.) please notify your surgeon as soon as possible. 10. It is important to be on time. If a situation occurs where you may be delayed, please call:  [phone] / [phone] on the day of surgery. 11. The Preadmission Testing staff can be reached at [phone]. 12. Special instructions: COVID CARD ON DAY OF SURGERY      No current facility-administered medications for this encounter. Current Outpatient Medications   Medication Sig    darbepoetin onofre (ARANESP) 60 mcg/0.3 mL injection by SubCUTAneous route every Monday, Wednesday, Friday.  sevelamer carbonate (RENVELA) 800 mg tab tab Take 1,600 mg by mouth three (3) times daily (with meals).  hydrALAZINE (APRESOLINE) 25 mg tablet Take 25 mg by mouth two (2) times a day.  calcitRIOL (ROCALTROL) 0.25 mcg capsule Take 0.25 mcg by mouth every morning. Patient states he is getting infusions of vitamin D during dialysis instead of taking pills    atorvastatin (LIPITOR) 10 mg tablet Take 1 Tab by mouth nightly.  indomethacin (INDOCIN) 50 mg capsule Once a day as needed       1. YOU MUST ONLY TAKE THESE MEDICATIONS THE MORNING OF SURGERY WITH A SIP OF WATER:HYDRALAZINE  2. MEDICATIONS TO TAKE THE MORNING OF SURGERY ONLY IF NEEDED: NONE  3. HOLD these prescription medications BEFORE Surgery: NONE  4. Ask your surgeon/prescribing physician about when/if to STOP taking these medications: NONE  5. Stop all vitamins, herbal medicines and Aspirin containing products 7 days prior to surgery. Stop any non-steroidal anti-inflammatory drugs (i.e. Ibuprofen, Naproxen, Advil, Aleve) 3 days before surgery. You may take Tylenol. 6. If you are currently taking Plavix, Coumadin, or any other blood-thinning/anticoagulant medication contact your prescribing physician for instructions.     Preventing Infections Before and After - Your Surgery    IMPORTANT INSTRUCTIONS            Tips to help prevent infections after your surgery:  1. Protect your surgical wound from germs:  ? Hand washing is the most important thing you and your caregivers can do to prevent infections. ? Keep your bandage clean and dry! ? Do not touch your surgical wound. 2. Use clean, freshly washed towels and washcloths every time you shower; do not share bath linens with others. 3. Until your surgical wound is healed, wear clothing and sleep on bed linens each day that are clean and freshly washed. 4. Do not allow pets to sleep in your bed with you or touch your surgical wound. 5. Do not smoke - smoking delays wound healing. This may be a good time to stop smoking. 6. If you have diabetes, it is important for you to manage your blood sugar levels properly before your surgery as well as after your surgery. Poorly managed blood sugar levels slow down wound healing and prevent you from healing completely. Patient Information Regarding COVID Restrictions    Day of Procedure     Please park in the parking deck or any designated visitor parking lot.  Enter the facility through the [facility] of the hospitals.   On the day of surgery, please provide the cell phone number of the person who will be waiting for you to the Patient Access representative at the time of registration.  Please wear a mask on the day of your procedure.  We are now allowing two designated visitors per stay. Pediatric patients may have 2 designated visitors. These two people may come in with you on the day of your procedure.  The designated visitor must also wear a mask.  Once your procedure and the immediate recovery period is completed, a nurse in the recovery area will contact your designated visitor to inform them of your room number or to otherwise review other pertinent information regarding your care.  Social distancing practices are to be adhered to in waiting areas and the cafeteria. The patient was contacted  via phone.    He verbalized understanding of all instructions does not  need reinforcement.

## 2022-07-11 ENCOUNTER — ANESTHESIA EVENT (OUTPATIENT)
Dept: CARDIOTHORACIC SURGERY | Age: 52
End: 2022-07-11
Payer: COMMERCIAL

## 2022-07-12 ENCOUNTER — ANESTHESIA (OUTPATIENT)
Dept: CARDIOTHORACIC SURGERY | Age: 52
End: 2022-07-12
Payer: COMMERCIAL

## 2022-07-12 ENCOUNTER — HOSPITAL ENCOUNTER (OUTPATIENT)
Age: 52
Setting detail: OUTPATIENT SURGERY
Discharge: HOME | End: 2022-07-12
Attending: SURGERY | Admitting: SURGERY
Payer: COMMERCIAL

## 2022-07-12 VITALS
WEIGHT: 180.78 LBS | DIASTOLIC BLOOD PRESSURE: 72 MMHG | OXYGEN SATURATION: 98 % | HEIGHT: 68 IN | HEART RATE: 67 BPM | RESPIRATION RATE: 17 BRPM | BODY MASS INDEX: 27.4 KG/M2 | TEMPERATURE: 97.8 F | SYSTOLIC BLOOD PRESSURE: 132 MMHG

## 2022-07-12 DIAGNOSIS — N18.30 STAGE 3 CHRONIC KIDNEY DISEASE, UNSPECIFIED WHETHER STAGE 3A OR 3B CKD (HCC): Primary | ICD-10-CM

## 2022-07-12 LAB
ATRIAL RATE: 68 BPM
BASE EXCESS BLD CALC-SCNC: 3.1 MMOL/L
CA-I BLD-MCNC: 1.04 MMOL/L (ref 1.12–1.32)
CALCULATED P AXIS, ECG09: 55 DEGREES
CALCULATED R AXIS, ECG10: 5 DEGREES
CALCULATED T AXIS, ECG11: 34 DEGREES
CHLORIDE BLD-SCNC: 102 MMOL/L (ref 100–108)
CO2 BLD-SCNC: 27 MMOL/L (ref 19–24)
CREAT UR-MCNC: 10.1 MG/DL (ref 0.6–1.3)
DIAGNOSIS, 93000: NORMAL
GLUCOSE BLD STRIP.AUTO-MCNC: 104 MG/DL (ref 65–117)
GLUCOSE BLD STRIP.AUTO-MCNC: 82 MG/DL (ref 74–106)
HCO3 BLDA-SCNC: 27 MMOL/L
LACTATE BLD-SCNC: 1.08 MMOL/L (ref 0.4–2)
P-R INTERVAL, ECG05: 160 MS
PCO2 BLDV: 38.5 MMHG (ref 41–51)
PH BLDV: 7.46 [PH] (ref 7.32–7.42)
PO2 BLDV: 45 MMHG (ref 25–40)
POTASSIUM BLD-SCNC: 3.9 MMOL/L (ref 3.5–5.5)
Q-T INTERVAL, ECG07: 430 MS
QRS DURATION, ECG06: 98 MS
QTC CALCULATION (BEZET), ECG08: 457 MS
SERVICE CMNT-IMP: NORMAL
SODIUM BLD-SCNC: 142 MMOL/L (ref 136–145)
SPECIMEN SITE: ABNORMAL
VENTRICULAR RATE, ECG03: 68 BPM

## 2022-07-12 PROCEDURE — 82962 GLUCOSE BLOOD TEST: CPT

## 2022-07-12 PROCEDURE — 74011250636 HC RX REV CODE- 250/636: Performed by: SURGERY

## 2022-07-12 PROCEDURE — 77030012770 HC TRCR OPT FX AMR -B: Performed by: SURGERY

## 2022-07-12 PROCEDURE — 74011250636 HC RX REV CODE- 250/636: Performed by: NURSE ANESTHETIST, CERTIFIED REGISTERED

## 2022-07-12 PROCEDURE — 74011000250 HC RX REV CODE- 250: Performed by: NURSE ANESTHETIST, CERTIFIED REGISTERED

## 2022-07-12 PROCEDURE — 77030008684 HC TU ET CUF COVD -B: Performed by: ANESTHESIOLOGY

## 2022-07-12 PROCEDURE — 76010000138 HC OR TIME 0.5 TO 1 HR: Performed by: SURGERY

## 2022-07-12 PROCEDURE — 77030010507 HC ADH SKN DERMBND J&J -B: Performed by: SURGERY

## 2022-07-12 PROCEDURE — 77030035029 HC NDL INSUF VERES DISP COVD -B: Performed by: SURGERY

## 2022-07-12 PROCEDURE — 82947 ASSAY GLUCOSE BLOOD QUANT: CPT

## 2022-07-12 PROCEDURE — 74011000250 HC RX REV CODE- 250: Performed by: SURGERY

## 2022-07-12 PROCEDURE — 77030010031 HC SCIS ENDOSC MPLR J&J -C: Performed by: SURGERY

## 2022-07-12 PROCEDURE — 2709999900 HC NON-CHARGEABLE SUPPLY: Performed by: SURGERY

## 2022-07-12 PROCEDURE — 93005 ELECTROCARDIOGRAM TRACING: CPT

## 2022-07-12 PROCEDURE — 74011250636 HC RX REV CODE- 250/636: Performed by: ANESTHESIOLOGY

## 2022-07-12 PROCEDURE — 77030040361 HC SLV COMPR DVT MDII -B: Performed by: SURGERY

## 2022-07-12 PROCEDURE — 77030006674 HC BLD MYRIN GYRS -B: Performed by: SURGERY

## 2022-07-12 PROCEDURE — 77030002933 HC SUT MCRYL J&J -A: Performed by: SURGERY

## 2022-07-12 PROCEDURE — 77030002986 HC SUT PROL J&J -A: Performed by: SURGERY

## 2022-07-12 PROCEDURE — 77030020829: Performed by: SURGERY

## 2022-07-12 PROCEDURE — 77030008756 HC TU IRR SUC STRY -B: Performed by: SURGERY

## 2022-07-12 PROCEDURE — 76060000033 HC ANESTHESIA 1 TO 1.5 HR: Performed by: SURGERY

## 2022-07-12 PROCEDURE — 76210000016 HC OR PH I REC 1 TO 1.5 HR: Performed by: SURGERY

## 2022-07-12 PROCEDURE — 76210000020 HC REC RM PH II FIRST 0.5 HR: Performed by: SURGERY

## 2022-07-12 PROCEDURE — 77030026438 HC STYL ET INTUB CARD -A: Performed by: ANESTHESIOLOGY

## 2022-07-12 PROCEDURE — 77030002966 HC SUT PDS J&J -A: Performed by: SURGERY

## 2022-07-12 RX ORDER — ROCURONIUM BROMIDE 10 MG/ML
INJECTION, SOLUTION INTRAVENOUS AS NEEDED
Status: DISCONTINUED | OUTPATIENT
Start: 2022-07-12 | End: 2022-07-12 | Stop reason: HOSPADM

## 2022-07-12 RX ORDER — MORPHINE SULFATE 2 MG/ML
2 INJECTION, SOLUTION INTRAMUSCULAR; INTRAVENOUS
Status: DISCONTINUED | OUTPATIENT
Start: 2022-07-12 | End: 2022-07-12 | Stop reason: HOSPADM

## 2022-07-12 RX ORDER — SODIUM CHLORIDE 0.9 % (FLUSH) 0.9 %
5-40 SYRINGE (ML) INJECTION AS NEEDED
Status: DISCONTINUED | OUTPATIENT
Start: 2022-07-12 | End: 2022-07-12 | Stop reason: HOSPADM

## 2022-07-12 RX ORDER — FENTANYL CITRATE 50 UG/ML
50 INJECTION, SOLUTION INTRAMUSCULAR; INTRAVENOUS AS NEEDED
Status: DISCONTINUED | OUTPATIENT
Start: 2022-07-12 | End: 2022-07-12 | Stop reason: HOSPADM

## 2022-07-12 RX ORDER — ACETAMINOPHEN 325 MG/1
650 TABLET ORAL ONCE
Status: DISCONTINUED | OUTPATIENT
Start: 2022-07-12 | End: 2022-07-12 | Stop reason: HOSPADM

## 2022-07-12 RX ORDER — ONDANSETRON 2 MG/ML
4 INJECTION INTRAMUSCULAR; INTRAVENOUS AS NEEDED
Status: DISCONTINUED | OUTPATIENT
Start: 2022-07-12 | End: 2022-07-12 | Stop reason: HOSPADM

## 2022-07-12 RX ORDER — DEXAMETHASONE SODIUM PHOSPHATE 4 MG/ML
INJECTION, SOLUTION INTRA-ARTICULAR; INTRALESIONAL; INTRAMUSCULAR; INTRAVENOUS; SOFT TISSUE AS NEEDED
Status: DISCONTINUED | OUTPATIENT
Start: 2022-07-12 | End: 2022-07-12 | Stop reason: HOSPADM

## 2022-07-12 RX ORDER — OXYCODONE HYDROCHLORIDE 5 MG/1
5 TABLET ORAL AS NEEDED
Status: DISCONTINUED | OUTPATIENT
Start: 2022-07-12 | End: 2022-07-12 | Stop reason: HOSPADM

## 2022-07-12 RX ORDER — PROPOFOL 10 MG/ML
INJECTION, EMULSION INTRAVENOUS AS NEEDED
Status: DISCONTINUED | OUTPATIENT
Start: 2022-07-12 | End: 2022-07-12 | Stop reason: HOSPADM

## 2022-07-12 RX ORDER — FENTANYL CITRATE 50 UG/ML
INJECTION, SOLUTION INTRAMUSCULAR; INTRAVENOUS AS NEEDED
Status: DISCONTINUED | OUTPATIENT
Start: 2022-07-12 | End: 2022-07-12 | Stop reason: HOSPADM

## 2022-07-12 RX ORDER — MIDAZOLAM HYDROCHLORIDE 1 MG/ML
1 INJECTION, SOLUTION INTRAMUSCULAR; INTRAVENOUS AS NEEDED
Status: DISCONTINUED | OUTPATIENT
Start: 2022-07-12 | End: 2022-07-12 | Stop reason: HOSPADM

## 2022-07-12 RX ORDER — GLYCOPYRROLATE 0.2 MG/ML
INJECTION INTRAMUSCULAR; INTRAVENOUS AS NEEDED
Status: DISCONTINUED | OUTPATIENT
Start: 2022-07-12 | End: 2022-07-12 | Stop reason: HOSPADM

## 2022-07-12 RX ORDER — FENTANYL CITRATE 50 UG/ML
25 INJECTION, SOLUTION INTRAMUSCULAR; INTRAVENOUS
Status: DISCONTINUED | OUTPATIENT
Start: 2022-07-12 | End: 2022-07-12 | Stop reason: HOSPADM

## 2022-07-12 RX ORDER — ROPIVACAINE HYDROCHLORIDE 5 MG/ML
30 INJECTION, SOLUTION EPIDURAL; INFILTRATION; PERINEURAL AS NEEDED
Status: DISCONTINUED | OUTPATIENT
Start: 2022-07-12 | End: 2022-07-12 | Stop reason: HOSPADM

## 2022-07-12 RX ORDER — LIDOCAINE HYDROCHLORIDE 20 MG/ML
INJECTION, SOLUTION EPIDURAL; INFILTRATION; INTRACAUDAL; PERINEURAL AS NEEDED
Status: DISCONTINUED | OUTPATIENT
Start: 2022-07-12 | End: 2022-07-12 | Stop reason: HOSPADM

## 2022-07-12 RX ORDER — ONDANSETRON 2 MG/ML
INJECTION INTRAMUSCULAR; INTRAVENOUS AS NEEDED
Status: DISCONTINUED | OUTPATIENT
Start: 2022-07-12 | End: 2022-07-12 | Stop reason: HOSPADM

## 2022-07-12 RX ORDER — SODIUM CHLORIDE 9 MG/ML
INJECTION, SOLUTION INTRAVENOUS
Status: DISCONTINUED | OUTPATIENT
Start: 2022-07-12 | End: 2022-07-12 | Stop reason: HOSPADM

## 2022-07-12 RX ORDER — SODIUM CHLORIDE 0.9 % (FLUSH) 0.9 %
5-40 SYRINGE (ML) INJECTION EVERY 8 HOURS
Status: DISCONTINUED | OUTPATIENT
Start: 2022-07-12 | End: 2022-07-12 | Stop reason: HOSPADM

## 2022-07-12 RX ORDER — MIDAZOLAM HYDROCHLORIDE 1 MG/ML
INJECTION, SOLUTION INTRAMUSCULAR; INTRAVENOUS AS NEEDED
Status: DISCONTINUED | OUTPATIENT
Start: 2022-07-12 | End: 2022-07-12 | Stop reason: HOSPADM

## 2022-07-12 RX ORDER — SODIUM CHLORIDE, SODIUM LACTATE, POTASSIUM CHLORIDE, CALCIUM CHLORIDE 600; 310; 30; 20 MG/100ML; MG/100ML; MG/100ML; MG/100ML
125 INJECTION, SOLUTION INTRAVENOUS CONTINUOUS
Status: DISCONTINUED | OUTPATIENT
Start: 2022-07-12 | End: 2022-07-12 | Stop reason: HOSPADM

## 2022-07-12 RX ORDER — MIDAZOLAM HYDROCHLORIDE 1 MG/ML
1 INJECTION, SOLUTION INTRAMUSCULAR; INTRAVENOUS
Status: DISCONTINUED | OUTPATIENT
Start: 2022-07-12 | End: 2022-07-12 | Stop reason: HOSPADM

## 2022-07-12 RX ORDER — DIPHENHYDRAMINE HYDROCHLORIDE 50 MG/ML
12.5 INJECTION, SOLUTION INTRAMUSCULAR; INTRAVENOUS AS NEEDED
Status: DISCONTINUED | OUTPATIENT
Start: 2022-07-12 | End: 2022-07-12 | Stop reason: HOSPADM

## 2022-07-12 RX ORDER — PHENYLEPHRINE HCL IN 0.9% NACL 0.4MG/10ML
SYRINGE (ML) INTRAVENOUS AS NEEDED
Status: DISCONTINUED | OUTPATIENT
Start: 2022-07-12 | End: 2022-07-12 | Stop reason: HOSPADM

## 2022-07-12 RX ORDER — DEXTROSE, SODIUM CHLORIDE, SODIUM LACTATE, POTASSIUM CHLORIDE, AND CALCIUM CHLORIDE 5; .6; .31; .03; .02 G/100ML; G/100ML; G/100ML; G/100ML; G/100ML
125 INJECTION, SOLUTION INTRAVENOUS CONTINUOUS
Status: DISCONTINUED | OUTPATIENT
Start: 2022-07-12 | End: 2022-07-12 | Stop reason: HOSPADM

## 2022-07-12 RX ORDER — LIDOCAINE HYDROCHLORIDE 10 MG/ML
0.5 INJECTION, SOLUTION EPIDURAL; INFILTRATION; INTRACAUDAL; PERINEURAL AS NEEDED
Status: DISCONTINUED | OUTPATIENT
Start: 2022-07-12 | End: 2022-07-12 | Stop reason: HOSPADM

## 2022-07-12 RX ORDER — BUPIVACAINE HYDROCHLORIDE AND EPINEPHRINE 5; 5 MG/ML; UG/ML
INJECTION, SOLUTION EPIDURAL; INTRACAUDAL; PERINEURAL AS NEEDED
Status: DISCONTINUED | OUTPATIENT
Start: 2022-07-12 | End: 2022-07-12 | Stop reason: HOSPADM

## 2022-07-12 RX ORDER — NEOSTIGMINE METHYLSULFATE 1 MG/ML
INJECTION, SOLUTION INTRAVENOUS AS NEEDED
Status: DISCONTINUED | OUTPATIENT
Start: 2022-07-12 | End: 2022-07-12 | Stop reason: HOSPADM

## 2022-07-12 RX ORDER — TRAMADOL HYDROCHLORIDE 50 MG/1
50 TABLET ORAL
Qty: 12 TABLET | Refills: 0 | Status: SHIPPED | OUTPATIENT
Start: 2022-07-12 | End: 2022-07-17

## 2022-07-12 RX ADMIN — DEXAMETHASONE SODIUM PHOSPHATE 4 MG: 4 INJECTION, SOLUTION INTRAMUSCULAR; INTRAVENOUS at 07:43

## 2022-07-12 RX ADMIN — ROCURONIUM BROMIDE 35 MG: 10 SOLUTION INTRAVENOUS at 07:34

## 2022-07-12 RX ADMIN — Medication 120 MCG: at 08:10

## 2022-07-12 RX ADMIN — FENTANYL CITRATE 25 MCG: 50 INJECTION, SOLUTION INTRAMUSCULAR; INTRAVENOUS at 09:06

## 2022-07-12 RX ADMIN — GLYCOPYRROLATE 0.2 MG: 0.2 INJECTION, SOLUTION INTRAMUSCULAR; INTRAVENOUS at 08:03

## 2022-07-12 RX ADMIN — ONDANSETRON HYDROCHLORIDE 4 MG: 2 INJECTION, SOLUTION INTRAMUSCULAR; INTRAVENOUS at 07:43

## 2022-07-12 RX ADMIN — Medication 80 MCG: at 08:04

## 2022-07-12 RX ADMIN — FENTANYL CITRATE 50 MCG: 50 INJECTION, SOLUTION INTRAMUSCULAR; INTRAVENOUS at 07:45

## 2022-07-12 RX ADMIN — NEOSTIGMINE METHYLSULFATE 3 MG: 1 INJECTION, SOLUTION INTRAVENOUS at 08:15

## 2022-07-12 RX ADMIN — GLYCOPYRROLATE 0.4 MG: 0.2 INJECTION, SOLUTION INTRAMUSCULAR; INTRAVENOUS at 08:15

## 2022-07-12 RX ADMIN — Medication 80 MCG: at 07:52

## 2022-07-12 RX ADMIN — PROPOFOL 150 MG: 10 INJECTION, EMULSION INTRAVENOUS at 07:34

## 2022-07-12 RX ADMIN — WATER 2 G: 1 INJECTION INTRAMUSCULAR; INTRAVENOUS; SUBCUTANEOUS at 07:40

## 2022-07-12 RX ADMIN — MIDAZOLAM 2 MG: 1 INJECTION INTRAMUSCULAR; INTRAVENOUS at 07:29

## 2022-07-12 RX ADMIN — FENTANYL CITRATE 50 MCG: 50 INJECTION, SOLUTION INTRAMUSCULAR; INTRAVENOUS at 07:32

## 2022-07-12 RX ADMIN — Medication 80 MCG: at 07:59

## 2022-07-12 RX ADMIN — Medication 80 MCG: at 07:55

## 2022-07-12 RX ADMIN — Medication 80 MCG: at 08:07

## 2022-07-12 RX ADMIN — LIDOCAINE HYDROCHLORIDE 100 MG: 20 INJECTION, SOLUTION EPIDURAL; INFILTRATION; INTRACAUDAL; PERINEURAL at 07:34

## 2022-07-12 RX ADMIN — SODIUM CHLORIDE: 900 INJECTION, SOLUTION INTRAVENOUS at 07:20

## 2022-07-12 NOTE — ANESTHESIA POSTPROCEDURE EVALUATION
Procedure(s):  REPOSITIONING OF LAPAROSCOPIC PERITONEAL DIALYSIS CATHETER, OMENTOPEXY. general    Anesthesia Post Evaluation        Patient location during evaluation: PACU  Patient participation: complete - patient participated  Level of consciousness: awake and alert  Pain management: adequate  Airway patency: patent  Anesthetic complications: no  Cardiovascular status: acceptable  Respiratory status: acceptable  Hydration status: acceptable  Comments: I have seen and evaluated the patient and is ready for discharge. Mimi Marin MD    Post anesthesia nausea and vomiting:  none      INITIAL Post-op Vital signs:   Vitals Value Taken Time   /72 07/12/22 0840   Temp     Pulse 65 07/12/22 0842   Resp 18 07/12/22 0842   SpO2 100 % 07/12/22 0842   Vitals shown include unvalidated device data.

## 2022-07-12 NOTE — PERIOP NOTES
I have reviewed discharge instructions in person with the patient and via telephone with wife using teach back method. The patient and wife verbalized understanding. Medications, signs, symptoms, and side effects reviewed. Opportunity for questions and clarification allowed. Patient discharging home via private vehicle. Hard-copy of discharge instructions given to patient. Copy of discharge instructions signed and placed on patient's chart.

## 2022-07-12 NOTE — H&P
History and Physical    Patient: Alexei Mohan MRN: [id number]  SSN: [id number]    YOB: 1970  Age: 47 y.o. Sex: male      Subjective:      Alexei Mohan is a 47 y.o. male who presents for repositioning of lap PD catheter. Past Medical History:   Diagnosis Date    Arthritis     Chronic kidney disease      DIALYSIS     Chronic pain     Dialysis patient ([facility] [address]     M,W,F    DM (diabetes mellitus) ([facility] [address]     NO MEDICATION    End stage kidney disease ([facility] [address]     Hemodialysis patient ([facility] [address] [facility]     HTN (hypertension)     Personal history of COVID-19      Past Surgical History:   Procedure Laterality Date    HX APPENDECTOMY      HX VASCULAR ACCESS Right     right chest jessie    HI ABDOMEN SURGERY PROC UNLISTED      PERITONIAL DIALYSIS CATHETER      Family History   Problem Relation Age of Onset    Diabetes Mother     Diabetes Father     No Known Problems Sister     No Known Problems Brother     No Known Problems Sister     No Known Problems Sister     No Known Problems Sister     No Known Problems Brother     No Known Problems Brother     No Known Problems Brother     No Known Problems Daughter     No Known Problems Son     No Known Problems Son     Anesth Problems Neg Hx      Social History     Tobacco Use    Smoking status: Former Smoker     Packs/day: 1.00     Years: 10.00     Pack years: 10.00     Quit date:      Years since quittin.5    Smokeless tobacco: Never Used   Substance Use Topics    Alcohol use: Never      Prior to Admission medications    Medication Sig Start Date End Date Taking? Authorizing Provider   darbepoetin onofre (ARANESP) 60 mcg/0.3 mL injection by SubCUTAneous route every Monday, Wednesday, Friday. Yes Provider, Historical   sevelamer carbonate (RENVELA) 800 mg tab tab Take 1,600 mg by mouth three (3) times daily (with meals).    Yes Provider, Historical   hydrALAZINE (APRESOLINE) 25 mg tablet Take 25 mg by mouth two (2) times a day.   Yes Provider, Historical   calcitRIOL (ROCALTROL) 0.25 mcg capsule Take 0.25 mcg by mouth every morning. Patient states he is getting infusions of vitamin D during dialysis instead of taking pills   Yes Provider, Historical   atorvastatin (LIPITOR) 10 mg tablet Take 1 Tab by mouth nightly. 11/22/19  Yes Joshua Pompa MD   indomethacin (INDOCIN) 50 mg capsule Once a day as needed 5/24/19   Joshua Pompa MD        No Known Allergies    Review of Systems:  A comprehensive review of systems was negative except for that written in the History of Present Illness. Objective:     Vitals:    07/08/22 1151 07/12/22 0644   BP:  121/76   Pulse:  68   Resp:  16   Temp:  98.1 °F (36.7 °C)   SpO2:  97%   Weight: 82 kg (180 lb 12.4 oz) 82 kg (180 lb 12.4 oz)   Height: 5' 8\" (1.727 m) 5' 8\" (1.727 m)        Physical Exam:  General: Patient is pleasant and cooperative and appears to be in no acute distress. HEENT: Normocephalic atraumatic. Appropriate tracking. No scleral icterus. Nares patent. Trachea is midline. Chest: Unlabored respirations. Symmetrical chest expansion. Cardiac: Regular rate and rhythm. Acyanotic  Abdomen: Abdomen is soft, nontender, nondistended. Extremities: Moves all extremities.   Vascular: Well healed PD catheter sites    Assessment:     Hospital Problems  Date Reviewed: 7/12/2022    None        ESRD     Plan:     Lap PD catheter repositioning    Signed By: Ghazal Acevedo MD     July 12, 2022

## 2022-07-12 NOTE — OP NOTES
Operative Note    Patient: Dave Schmitt  YOB: 1970  MRN: [id number]    Date of Procedure: 7/12/2022     Pre-Op Diagnosis: ESRD    Post-Op Diagnosis: Same as preoperative diagnosis. Procedure(s):  REPOSITIONING OF LAPAROSCOPIC PERITONEAL DIALYSIS CATHETER, lysis of adhesions, OMENTOPEXY    Surgeon(s):  Kendrick Cordoba MD    Surgical Assistant: Surg Asst-1: Laurel Goodpasture    Anesthesia: General     Estimated Blood Loss (mL):  less than 50     Complications: None    Specimens: * No specimens in log *     Implants: * No implants in log *    Drains: * No LDAs found *    Findings: Fibrinous slough within the PD catheter. Omental wrapping around PD catheter preventing it from withdrawing. After removing the fibrinous tissue, lysis of adhesions and omental pexy the catheter sits unobstructed within the pelvis, flushes and withdraws easily. Detailed Description of Procedure: The patient was transported to the operating room. The patient was placed supine on the operating room table. Intubation and monitoring per anesthesia. The patient was prepped and draped in a standard fashion. An appropriate surgical timeout was performed with all members of the team present. The patient was administered 2g Ancef for the antibiotic prophylaxis. The PD catheter flushes easily. Insufflation was established through the PD catheter. Next an optiview port was inserted via prior LUQ incision. A second port was placed RUQ and the two were used to take down the omental adhesions and free the catheter. Adhesions were taken down sharply with metzembaum scissors. Next the fibrinous slough was flushed and manually extracted from the catheter and discarded. Appropriate functioning was confirmed. A second RLQ port was then placed and the LUQ port removed. Pexy of the omentum was then performed into the LUQ using neisha rebeca and 2-0 PDS x2. Catheter function was once again confirmed. The pelvis was suctioned.  All ports were removed. Port sites x3 were then closed using 4-0 monocryl sutures, dressed with dermabond. Catheter site was dressed in a sterile manner. The patient tolerated the procedure very well. He was successfully awoken and transported to the recovery room in stable condition.     Electronically Signed by Maria Esther Rosario MD on 7/12/2022 at 8:25 AM

## 2022-07-12 NOTE — DISCHARGE INSTRUCTIONS
Patient Education        Peritoneal Dialysis Catheter Placement: What to Expect at [facility]   Peritoneal dialysis catheter placement is a surgery to place a soft tube (catheter) in an area of your belly (peritoneum) for dialysis. Dialysis does the work of your kidneys when they fail. Your doctor made a small cut (incision) in your belly. Then your doctor placed a catheter through the cut and into your belly. The cut was closed with stitches. The end of the catheter exits out of your belly. In 10 to 14 days you'll start dialysis. Dialysis fluid will flow into your belly through the catheter. It stays there for several hours. Then it flows out of your belly through the catheter. Keep the bandage around the catheter dry, and don't take it off. Be careful not to bump or move the catheter. You may have some pain for a few days. You may also feel tired and sick to your stomach. Be sure to rest and drink plenty of fluids. This care sheet gives you a general idea about how long it will take for you to recover. But each person recovers at a different pace. Follow the steps below to get better as quickly as possible. How can you care for yourself at home? Activity    · Do not lift heavy objects.     · Limit physical activity. Ask your doctor when it's okay to do your normal activities.     · Do not swim until your doctor says it's okay. Diet    · Avoid constipation and straining. Drink plenty of water. Your doctor may suggest fiber, a stool softener, or a mild laxative. Medicines    · If you take aspirin or some other blood thinner, ask your doctor if and when to start taking it again. Make sure that you understand exactly what your doctor wants you to do. Incision care    · You will have a dressing over the cut (incision). A dressing helps the incision heal and protects it. Do not change or care for the dressing.  Dressing changes will be done by your care team until the cut heals.     · Keep the dressing clean and dry.     · Try not to bump or move the catheter until the cut heals. Hygiene    · Use a washcloth to clean your body. Be careful to not get the bandage wet.     · Ask your doctor when it's okay to shower or take a bath. Catheter care after the incision heals    · Always wash your hands before and after you touch the catheter.     · Clean your catheter site daily with antibacterial soap. Wear gloves. Pat dry with a clean towel.     · Apply antibacterial ointment to clean the skin around the catheter. Your care team will tell you what ointment to use and how to apply it. Replace with a clean dressing.     · Always clean and dry your catheter and access area right away after you get wet.     · Fasten or tape the catheter to your body to keep it from catching on your clothes.     · Never use scissors or other sharp objects around your catheter.     · Store your dialysis supplies in a cool, dry place. Follow-up care is a key part of your treatment and safety. Be sure to make and go to all appointments, and call your doctor if you are having problems. It's also a good idea to know your test results and keep a list of the medicines you take. When should you call for help? Call your doctor now or seek immediate medical care if:    · You have symptoms of infection, such as:  ? Increased pain, swelling, warmth, or redness around the cut.  ? Red streaks leading from the cut.  ? Pus draining from the cut.  ? A fever.     · You have belly pain.     · You have nausea or vomiting. Watch closely for changes in your health, and be sure to contact your doctor if:    · The dialysis fluid looks cloudy or is a different color.     · Fluid does not flow through the catheter.     · The dialysis equipment isn't working. Where can you learn more?   Go to http://www.gray.com/  Enter P012 in the search box to learn more about \"Peritoneal Dialysis Catheter Placement: What to Expect at Home.\"  Current as of: September 8, 2021               Content Version: 13.2  © 4163-1887 [employer]. Care instructions adapted under license by [employer] (which disclaims liability or warranty for this information). If you have questions about a medical condition or this instruction, always ask your healthcare professional. [address] any warranty or liability for your use of this information. ______________________________________________________________________    Anesthesia Discharge Instructions    After general anesthesia or intervenous sedation, for 24 hours or while taking prescription Narcotics:  · Limit your activities  · Do not drive or operate hazardous machinery  · If you have not urinated within 8 hours after discharge, please contact your surgeon on call. · Do not make important personal or business decisions  · Do not drink alcoholic beverages    Report the following to your surgeon:  · Excessive pain, swelling, redness or odor of or around the surgical area  · Temperature over 100.5 degrees  · Nausea and vomiting lasting longer than 4 hours or if unable to take medication  · Any signs of decreased circulation or nerve impairment to extremity:  Change in color, persistent numbness, tingling, coldness or increased pain.   · Any questions

## 2023-05-21 RX ORDER — ATORVASTATIN CALCIUM 10 MG/1
10 TABLET, FILM COATED ORAL
COMMUNITY
Start: 2019-11-22

## 2023-05-21 RX ORDER — HYDRALAZINE HYDROCHLORIDE 25 MG/1
25 TABLET, FILM COATED ORAL 2 TIMES DAILY
COMMUNITY

## 2023-05-21 RX ORDER — SEVELAMER CARBONATE 800 MG/1
1600 TABLET, FILM COATED ORAL
COMMUNITY

## 2023-05-21 RX ORDER — CALCITRIOL 0.25 UG/1
0.25 CAPSULE, LIQUID FILLED ORAL
COMMUNITY

## 2023-05-21 RX ORDER — INDOMETHACIN 50 MG/1
CAPSULE ORAL
COMMUNITY
Start: 2019-05-24

## 2025-01-28 ENCOUNTER — APPOINTMENT (OUTPATIENT)
Facility: HOSPITAL | Age: 55
End: 2025-01-28
Payer: COMMERCIAL

## 2025-01-28 ENCOUNTER — HOSPITAL ENCOUNTER (EMERGENCY)
Facility: HOSPITAL | Age: 55
Discharge: HOME | End: 2025-01-29
Attending: STUDENT IN AN ORGANIZED HEALTH CARE EDUCATION/TRAINING PROGRAM
Payer: COMMERCIAL

## 2025-01-28 VITALS
HEIGHT: 68 IN | RESPIRATION RATE: 18 BRPM | WEIGHT: 223.11 LBS | TEMPERATURE: 97.7 F | OXYGEN SATURATION: 100 % | DIASTOLIC BLOOD PRESSURE: 79 MMHG | SYSTOLIC BLOOD PRESSURE: 121 MMHG | BODY MASS INDEX: 33.81 KG/M2 | HEART RATE: 65 BPM

## 2025-01-28 DIAGNOSIS — R79.89 ELEVATED SERUM CREATININE: ICD-10-CM

## 2025-01-28 DIAGNOSIS — J40 BRONCHITIS: Primary | ICD-10-CM

## 2025-01-28 DIAGNOSIS — K80.20 CALCULUS OF GALLBLADDER WITHOUT CHOLECYSTITIS WITHOUT OBSTRUCTION: ICD-10-CM

## 2025-01-28 DIAGNOSIS — I27.20 PULMONARY HYPERTENSION (HCC): ICD-10-CM

## 2025-01-28 DIAGNOSIS — N28.89 CALIECTASIS: ICD-10-CM

## 2025-01-28 DIAGNOSIS — S22.31XA CLOSED FRACTURE OF ONE RIB OF RIGHT SIDE, INITIAL ENCOUNTER: ICD-10-CM

## 2025-01-28 LAB
ALBUMIN SERPL-MCNC: 3.9 G/DL (ref 3.5–5)
ALBUMIN/GLOB SERPL: 1.2 (ref 1.1–2.2)
ALP SERPL-CCNC: 72 U/L (ref 45–117)
ALT SERPL-CCNC: 33 U/L (ref 12–78)
ANION GAP SERPL CALC-SCNC: 4 MMOL/L (ref 2–12)
AST SERPL-CCNC: 16 U/L (ref 15–37)
BASOPHILS # BLD: 0.02 K/UL (ref 0–0.1)
BASOPHILS NFR BLD: 0.3 % (ref 0–1)
BILIRUB SERPL-MCNC: 0.4 MG/DL (ref 0.2–1)
BUN SERPL-MCNC: 35 MG/DL (ref 6–20)
BUN/CREAT SERPL: 11 (ref 12–20)
CALCIUM SERPL-MCNC: 9.6 MG/DL (ref 8.5–10.1)
CHLORIDE SERPL-SCNC: 108 MMOL/L (ref 97–108)
CO2 SERPL-SCNC: 27 MMOL/L (ref 21–32)
COMMENT:: NORMAL
CREAT SERPL-MCNC: 3.13 MG/DL (ref 0.7–1.3)
DIFFERENTIAL METHOD BLD: ABNORMAL
EOSINOPHIL # BLD: 0.26 K/UL (ref 0–0.4)
EOSINOPHIL NFR BLD: 3.7 % (ref 0–7)
ERYTHROCYTE [DISTWIDTH] IN BLOOD BY AUTOMATED COUNT: 11.8 % (ref 11.5–14.5)
ETHANOL SERPL-MCNC: <10 MG/DL (ref 0–0.08)
GLOBULIN SER CALC-MCNC: 3.3 G/DL (ref 2–4)
GLUCOSE SERPL-MCNC: 179 MG/DL (ref 65–100)
HCT VFR BLD AUTO: 40.1 % (ref 36.6–50.3)
HGB BLD-MCNC: 12.9 G/DL (ref 12.1–17)
IMM GRANULOCYTES # BLD AUTO: 0.04 K/UL (ref 0–0.04)
IMM GRANULOCYTES NFR BLD AUTO: 0.6 % (ref 0–0.5)
LIPASE SERPL-CCNC: 43 U/L (ref 13–75)
LYMPHOCYTES # BLD: 0.69 K/UL (ref 0.8–3.5)
LYMPHOCYTES NFR BLD: 9.9 % (ref 12–49)
MCH RBC QN AUTO: 29.4 PG (ref 26–34)
MCHC RBC AUTO-ENTMCNC: 32.2 G/DL (ref 30–36.5)
MCV RBC AUTO: 91.3 FL (ref 80–99)
MONOCYTES # BLD: 0.66 K/UL (ref 0–1)
MONOCYTES NFR BLD: 9.4 % (ref 5–13)
NEUTS SEG # BLD: 5.33 K/UL (ref 1.8–8)
NEUTS SEG NFR BLD: 76.1 % (ref 32–75)
NRBC # BLD: 0 K/UL (ref 0–0.01)
NRBC BLD-RTO: 0 PER 100 WBC
NT PRO BNP: 85 PG/ML
PLATELET # BLD AUTO: 210 K/UL (ref 150–400)
PMV BLD AUTO: 10 FL (ref 8.9–12.9)
POTASSIUM SERPL-SCNC: 3.9 MMOL/L (ref 3.5–5.1)
PROT SERPL-MCNC: 7.2 G/DL (ref 6.4–8.2)
RBC # BLD AUTO: 4.39 M/UL (ref 4.1–5.7)
RBC MORPH BLD: ABNORMAL
SODIUM SERPL-SCNC: 139 MMOL/L (ref 136–145)
SPECIMEN HOLD: NORMAL
TROPONIN I SERPL HS-MCNC: 12 NG/L (ref 0–76)
WBC # BLD AUTO: 7 K/UL (ref 4.1–11.1)
WBC MORPH BLD: ABNORMAL

## 2025-01-28 PROCEDURE — 6370000000 HC RX 637 (ALT 250 FOR IP): Performed by: STUDENT IN AN ORGANIZED HEALTH CARE EDUCATION/TRAINING PROGRAM

## 2025-01-28 PROCEDURE — 99285 EMERGENCY DEPT VISIT HI MDM: CPT

## 2025-01-28 PROCEDURE — 85025 COMPLETE CBC W/AUTO DIFF WBC: CPT

## 2025-01-28 PROCEDURE — 93005 ELECTROCARDIOGRAM TRACING: CPT | Performed by: STUDENT IN AN ORGANIZED HEALTH CARE EDUCATION/TRAINING PROGRAM

## 2025-01-28 PROCEDURE — 2580000003 HC RX 258: Performed by: STUDENT IN AN ORGANIZED HEALTH CARE EDUCATION/TRAINING PROGRAM

## 2025-01-28 PROCEDURE — 84484 ASSAY OF TROPONIN QUANT: CPT

## 2025-01-28 PROCEDURE — 83880 ASSAY OF NATRIURETIC PEPTIDE: CPT

## 2025-01-28 PROCEDURE — 6360000002 HC RX W HCPCS: Performed by: STUDENT IN AN ORGANIZED HEALTH CARE EDUCATION/TRAINING PROGRAM

## 2025-01-28 PROCEDURE — 71046 X-RAY EXAM CHEST 2 VIEWS: CPT

## 2025-01-28 PROCEDURE — 71250 CT THORAX DX C-: CPT

## 2025-01-28 PROCEDURE — 36415 COLL VENOUS BLD VENIPUNCTURE: CPT

## 2025-01-28 PROCEDURE — 96374 THER/PROPH/DIAG INJ IV PUSH: CPT

## 2025-01-28 PROCEDURE — 80053 COMPREHEN METABOLIC PANEL: CPT

## 2025-01-28 PROCEDURE — 83690 ASSAY OF LIPASE: CPT

## 2025-01-28 PROCEDURE — 82077 ASSAY SPEC XCP UR&BREATH IA: CPT

## 2025-01-28 RX ORDER — BENZONATATE 200 MG/1
200 CAPSULE ORAL 3 TIMES DAILY PRN
Qty: 30 CAPSULE | Refills: 0 | Status: SHIPPED | OUTPATIENT
Start: 2025-01-28 | End: 2025-02-07

## 2025-01-28 RX ORDER — OXYCODONE AND ACETAMINOPHEN 5; 325 MG/1; MG/1
1 TABLET ORAL
Status: COMPLETED | OUTPATIENT
Start: 2025-01-28 | End: 2025-01-28

## 2025-01-28 RX ORDER — OXYCODONE HYDROCHLORIDE 5 MG/1
5 TABLET ORAL EVERY 6 HOURS PRN
Qty: 12 TABLET | Refills: 0 | Status: SHIPPED | OUTPATIENT
Start: 2025-01-28 | End: 2025-01-31

## 2025-01-28 RX ORDER — 0.9 % SODIUM CHLORIDE 0.9 %
1000 INTRAVENOUS SOLUTION INTRAVENOUS ONCE
Status: COMPLETED | OUTPATIENT
Start: 2025-01-28 | End: 2025-01-28

## 2025-01-28 RX ORDER — AZITHROMYCIN 250 MG/1
TABLET, FILM COATED ORAL
Qty: 6 TABLET | Refills: 0 | Status: SHIPPED | OUTPATIENT
Start: 2025-01-28 | End: 2025-02-07

## 2025-01-28 RX ORDER — ALBUTEROL SULFATE 90 UG/1
2 INHALANT RESPIRATORY (INHALATION) 4 TIMES DAILY PRN
Qty: 18 G | Refills: 0 | Status: SHIPPED | OUTPATIENT
Start: 2025-01-28

## 2025-01-28 RX ORDER — LIDOCAINE 50 MG/G
1 PATCH TOPICAL DAILY
Qty: 10 PATCH | Refills: 0 | Status: SHIPPED | OUTPATIENT
Start: 2025-01-28 | End: 2025-02-07

## 2025-01-28 RX ORDER — IPRATROPIUM BROMIDE AND ALBUTEROL SULFATE 2.5; .5 MG/3ML; MG/3ML
1 SOLUTION RESPIRATORY (INHALATION)
Status: COMPLETED | OUTPATIENT
Start: 2025-01-28 | End: 2025-01-28

## 2025-01-28 RX ORDER — DEXAMETHASONE SODIUM PHOSPHATE 10 MG/ML
10 INJECTION, SOLUTION INTRAMUSCULAR; INTRAVENOUS ONCE
Status: COMPLETED | OUTPATIENT
Start: 2025-01-28 | End: 2025-01-28

## 2025-01-28 RX ADMIN — SODIUM CHLORIDE 1000 ML: 9 INJECTION, SOLUTION INTRAVENOUS at 22:47

## 2025-01-28 RX ADMIN — OXYCODONE AND ACETAMINOPHEN 1 TABLET: 5; 325 TABLET ORAL at 21:45

## 2025-01-28 RX ADMIN — DEXAMETHASONE SODIUM PHOSPHATE 10 MG: 10 INJECTION, SOLUTION INTRAMUSCULAR; INTRAVENOUS at 23:27

## 2025-01-28 RX ADMIN — ALBUTEROL SULFATE 1 DOSE: 2.5 SOLUTION RESPIRATORY (INHALATION) at 23:23

## 2025-01-28 RX ADMIN — ALBUTEROL SULFATE 1 DOSE: 2.5 SOLUTION RESPIRATORY (INHALATION) at 23:53

## 2025-01-28 RX ADMIN — IPRATROPIUM BROMIDE AND ALBUTEROL SULFATE 1 DOSE: 2.5; .5 SOLUTION RESPIRATORY (INHALATION) at 21:40

## 2025-01-28 ASSESSMENT — PAIN - FUNCTIONAL ASSESSMENT: PAIN_FUNCTIONAL_ASSESSMENT: 0-10

## 2025-01-28 ASSESSMENT — PAIN DESCRIPTION - ORIENTATION: ORIENTATION: RIGHT

## 2025-01-28 ASSESSMENT — PAIN DESCRIPTION - LOCATION: LOCATION: FLANK

## 2025-01-28 ASSESSMENT — PAIN DESCRIPTION - DESCRIPTORS: DESCRIPTORS: ACHING

## 2025-01-28 ASSESSMENT — PAIN SCALES - GENERAL: PAINLEVEL_OUTOF10: 6

## 2025-01-29 LAB
EKG ATRIAL RATE: 63 BPM
EKG DIAGNOSIS: NORMAL
EKG P AXIS: 62 DEGREES
EKG P-R INTERVAL: 148 MS
EKG Q-T INTERVAL: 402 MS
EKG QRS DURATION: 94 MS
EKG QTC CALCULATION (BAZETT): 411 MS
EKG R AXIS: 30 DEGREES
EKG T AXIS: 53 DEGREES
EKG VENTRICULAR RATE: 63 BPM

## 2025-01-29 PROCEDURE — 93010 ELECTROCARDIOGRAM REPORT: CPT | Performed by: SPECIALIST

## 2025-01-29 NOTE — ED PROVIDER NOTES
pneumonia.  Labs are significant for a creatinine of 3.13 which is mildly increased from his prior few weeks ago which was 2.8.  Will give a liter of IV fluids due to possible dehydration.  His troponin is low risk and proBNP is reassuring.  LFTs are normal.  CT chest abdomen pelvis without contrast ordered which shows acute lateral ninth rib fracture.  He denies any known history of trauma or injury.  Likely secondary to coughing.  Pulmonary arterial hypertension also noted.  He also has cholelithiasis as well as pelvicaliectasis of the transplanted kidney and slight prominence of the proximal transplant ureter.  Discussed results with patient as well as treatment plan and plan for discharge home.  Discussed patient case with Dr. Pena who agrees with assessment and plan and plan for discharge home.  Strict return to ER precautions were discussed in detail.    Amount and/or Complexity of Data Reviewed  Labs: ordered. Decision-making details documented in ED Course.  Radiology: ordered.  ECG/medicine tests: ordered.    Risk  Prescription drug management.            REASSESSMENT     ED Course as of 01/28/25 2323 Tue Jan 28, 2025   2245 Total Bilirubin: 0.4 [KG]      ED Course User Index  [KG] Arianna Blas, PA           CONSULTS:  None    PROCEDURES:  Unless otherwise noted below, none     Procedures      FINAL IMPRESSION      1. Bronchitis    2. Closed fracture of one rib of right side, initial encounter    3. Calculus of gallbladder without cholecystitis without obstruction    4. Elevated serum creatinine    5. Caliectasis    6. Pulmonary hypertension (HCC)          DISPOSITION/PLAN   DISPOSITION Decision To Discharge 01/28/2025 11:05:55 PM      PATIENT REFERRED TO:  Richa Angel MD  [address] [address] [phone]    Schedule an appointment as soon as possible for a visit       Lis Garvey MD  [address] [facility]  [address]  [facility]

## 2025-01-29 NOTE — DISCHARGE INSTRUCTIONS
Take antibiotics as prescribed. Take oxycodone as prescribed for pain. Take tessalon perles to help with cough. Use albuterol inhaler as prescribed for wheezing and shortness of breath. Use incentive spirometer to help prevent pneumonia. Schedule an appointment to be seen by your primary care physician for close follow-up. Also recommend follow-up with general surgeon for cholelithiasis. If you develop new or worsening symptoms return to ER.

## 2025-01-29 NOTE — ED TRIAGE NOTES
Patient ambulatory to triage reporting worsening cough and R side pain which has been going on for several months but worsened over last 2 days. Patient reporting it is hard to take a deep breath. Patient had kidney transplant last year on R side at [facility].     Provider assessment in triage

## (undated) DEVICE — BLADE SCALPEL NO 15 STERILE

## (undated) DEVICE — GARMENT,MEDLINE,DVT,INT,CALF,MED, GEN2: Brand: MEDLINE

## (undated) DEVICE — Device

## (undated) DEVICE — GENERAL LAPAROSCOPY - SMH: Brand: MEDLINE INDUSTRIES, INC.

## (undated) DEVICE — HANDLE LT SNAP ON ULT DURABLE LENS FOR TRUMPF ALC DISPOSABLE

## (undated) DEVICE — SUTURE MCRYL SZ 4-0 L27IN ABSRB UD L19MM PS-2 1/2 CIR PRIM Y426H

## (undated) DEVICE — SOLUTION IRRIG 1000ML 0.9% SOD CHL USP POUR PLAS BTL

## (undated) DEVICE — GLOVE ORTHO 8   MSG9480

## (undated) DEVICE — SUTURE VCRL SZ 3-0 L27IN ABSRB UD FS-2 L19MM 1/2 CIR J423H

## (undated) DEVICE — LAPAROSCOPIC TROCAR SLEEVE/SINGLE USE: Brand: KII® OPTICAL ACCESS SYSTEM

## (undated) DEVICE — TROCAR: Brand: KII® SLEEVE

## (undated) DEVICE — DERMABOND SKIN ADH 0.7ML -- DERMABOND ADVANCED 12/BX

## (undated) DEVICE — HYPODERMIC SAFETY NEEDLE: Brand: MONOJECT

## (undated) DEVICE — SCISSORS ENDOSCP DIA5MM CRV MPLR CAUT W/ RATCH HNDL

## (undated) DEVICE — INSUFFLATION NEEDLE: Brand: SURGINEEDLE

## (undated) DEVICE — SPONGE GZ W4XL4IN COT RADPQ HIGHLY ABSRB

## (undated) DEVICE — SUTURE VCRL SZ 2-0 L36IN ABSRB UD L36MM CT-1 1/2 CIR J945H

## (undated) DEVICE — SPONGE GZ W4XL4IN COT 12 PLY TYP VII WVN C FLD DSGN

## (undated) DEVICE — THIS ADAPTER IS A DOUBLE SEALING FEMALE LUER LOCK ADAPTER WITH A 2-PIECE, COMBINATION COMPRESSION FIT/BARBED CATHETER CONNECTOR. THE ADAPTER IS USED TO CONNECT THE PD CATHETER TO A SOLUTION TRANSFER SET WITH LOCKING CONNECTOR.: Brand: LOCKING TITANIUM ADAPTER FOR PERITONEAL DIALYSIS CATHETER

## (undated) DEVICE — SUTURE PROL SZ 0 L30IN NONABSORBABLE BLU L26MM CT-2 1/2 CIR 8412H

## (undated) DEVICE — SUT PROL 2-0 30IN CT2 BLU --

## (undated) DEVICE — ROCKER SWITCH PENCIL BLADE ELECTRODE, HOLSTER: Brand: EDGE

## (undated) DEVICE — SUTURE PDS II SZ 2-0 L27IN ABSRB VLT SH L26MM 1/2 CIR Z317H